# Patient Record
Sex: MALE | Race: OTHER | NOT HISPANIC OR LATINO | URBAN - METROPOLITAN AREA
[De-identification: names, ages, dates, MRNs, and addresses within clinical notes are randomized per-mention and may not be internally consistent; named-entity substitution may affect disease eponyms.]

---

## 2017-05-12 ENCOUNTER — INPATIENT (INPATIENT)
Facility: HOSPITAL | Age: 32
LOS: 5 days | Discharge: HOME | End: 2017-05-18
Attending: INTERNAL MEDICINE | Admitting: INTERNAL MEDICINE
Payer: MEDICAID

## 2017-05-12 VITALS
DIASTOLIC BLOOD PRESSURE: 89 MMHG | HEART RATE: 110 BPM | OXYGEN SATURATION: 100 % | RESPIRATION RATE: 20 BRPM | SYSTOLIC BLOOD PRESSURE: 140 MMHG | TEMPERATURE: 98 F

## 2017-05-12 DIAGNOSIS — K92.2 GASTROINTESTINAL HEMORRHAGE, UNSPECIFIED: ICD-10-CM

## 2017-05-12 LAB
ALBUMIN SERPL ELPH-MCNC: 4.3 G/DL — SIGNIFICANT CHANGE UP (ref 3.3–5)
ALP SERPL-CCNC: 106 U/L — SIGNIFICANT CHANGE UP (ref 40–120)
ALT FLD-CCNC: 59 U/L — HIGH (ref 4–41)
AMPHET UR-MCNC: NEGATIVE — SIGNIFICANT CHANGE UP
ANISOCYTOSIS BLD QL: SIGNIFICANT CHANGE UP
APAP SERPL-MCNC: < 15 UG/ML — LOW (ref 15–25)
APPEARANCE UR: SIGNIFICANT CHANGE UP
AST SERPL-CCNC: 89 U/L — HIGH (ref 4–40)
BACTERIA # UR AUTO: SIGNIFICANT CHANGE UP
BARBITURATES MEASUREMENT: NEGATIVE — SIGNIFICANT CHANGE UP
BARBITURATES UR SCN-MCNC: NEGATIVE — SIGNIFICANT CHANGE UP
BASE EXCESS BLDV CALC-SCNC: -2 MMOL/L — SIGNIFICANT CHANGE UP
BASOPHILS # BLD AUTO: 0.09 K/UL — SIGNIFICANT CHANGE UP (ref 0–0.2)
BASOPHILS NFR BLD AUTO: 1.4 % — SIGNIFICANT CHANGE UP (ref 0–2)
BASOPHILS NFR SPEC: 1.7 % — SIGNIFICANT CHANGE UP (ref 0–2)
BENZODIAZ SERPL-MCNC: NEGATIVE — SIGNIFICANT CHANGE UP
BENZODIAZ UR-MCNC: NEGATIVE — SIGNIFICANT CHANGE UP
BILIRUB SERPL-MCNC: 1.3 MG/DL — HIGH (ref 0.2–1.2)
BILIRUB UR-MCNC: NEGATIVE — SIGNIFICANT CHANGE UP
BLASTS # FLD: 0 % — SIGNIFICANT CHANGE UP (ref 0–0)
BLD GP AB SCN SERPL QL: NEGATIVE — SIGNIFICANT CHANGE UP
BLOOD GAS VENOUS - CREATININE: 0.53 MG/DL — SIGNIFICANT CHANGE UP (ref 0.5–1.3)
BLOOD UR QL VISUAL: HIGH
BUN SERPL-MCNC: 10 MG/DL — SIGNIFICANT CHANGE UP (ref 7–23)
CALCIUM SERPL-MCNC: 8.6 MG/DL — SIGNIFICANT CHANGE UP (ref 8.4–10.5)
CANNABINOIDS UR-MCNC: NEGATIVE — SIGNIFICANT CHANGE UP
CHLORIDE BLDV-SCNC: 105 MMOL/L — SIGNIFICANT CHANGE UP (ref 96–108)
CHLORIDE SERPL-SCNC: 99 MMOL/L — SIGNIFICANT CHANGE UP (ref 98–107)
CO2 SERPL-SCNC: 19 MMOL/L — LOW (ref 22–31)
COCAINE METAB.OTHER UR-MCNC: NEGATIVE — SIGNIFICANT CHANGE UP
COLOR SPEC: YELLOW — SIGNIFICANT CHANGE UP
CREAT SERPL-MCNC: 0.56 MG/DL — SIGNIFICANT CHANGE UP (ref 0.5–1.3)
EOSINOPHIL # BLD AUTO: 0.2 K/UL — SIGNIFICANT CHANGE UP (ref 0–0.5)
EOSINOPHIL NFR BLD AUTO: 3.1 % — SIGNIFICANT CHANGE UP (ref 0–6)
EOSINOPHIL NFR FLD: 1.7 % — SIGNIFICANT CHANGE UP (ref 0–6)
ETHANOL BLD-MCNC: 65 MG/DL — HIGH
GAS PNL BLDV: 132 MMOL/L — LOW (ref 136–146)
GIANT PLATELETS BLD QL SMEAR: PRESENT — SIGNIFICANT CHANGE UP
GLUCOSE BLDV-MCNC: 101 — HIGH (ref 70–99)
GLUCOSE SERPL-MCNC: 100 MG/DL — HIGH (ref 70–99)
GLUCOSE UR-MCNC: NEGATIVE — SIGNIFICANT CHANGE UP
HCO3 BLDV-SCNC: 23 MMOL/L — SIGNIFICANT CHANGE UP (ref 20–27)
HCT VFR BLD CALC: 27.5 % — LOW (ref 39–50)
HCT VFR BLDV CALC: 21.6 % — LOW (ref 39–51)
HGB BLD-MCNC: 7.5 G/DL — LOW (ref 13–17)
HGB BLDV-MCNC: 6.9 G/DL — CRITICAL LOW (ref 13–17)
HYPOCHROMIA BLD QL: SIGNIFICANT CHANGE UP
IMM GRANULOCYTES NFR BLD AUTO: 0.5 % — SIGNIFICANT CHANGE UP (ref 0–1.5)
KETONES UR-MCNC: NEGATIVE — SIGNIFICANT CHANGE UP
LACTATE BLDV-MCNC: 1.2 MMOL/L — SIGNIFICANT CHANGE UP (ref 0.5–2)
LEUKOCYTE ESTERASE UR-ACNC: NEGATIVE — SIGNIFICANT CHANGE UP
LIDOCAIN IGE QN: 53.9 U/L — SIGNIFICANT CHANGE UP (ref 7–60)
LYMPHOCYTES # BLD AUTO: 1.22 K/UL — SIGNIFICANT CHANGE UP (ref 1–3.3)
LYMPHOCYTES # BLD AUTO: 18.8 % — SIGNIFICANT CHANGE UP (ref 13–44)
LYMPHOCYTES NFR SPEC AUTO: 20 % — SIGNIFICANT CHANGE UP (ref 13–44)
MCHC RBC-ENTMCNC: 18.4 PG — LOW (ref 27–34)
MCHC RBC-ENTMCNC: 27.3 % — LOW (ref 32–36)
MCV RBC AUTO: 67.6 FL — LOW (ref 80–100)
METAMYELOCYTES # FLD: 0 % — SIGNIFICANT CHANGE UP (ref 0–1)
METHADONE UR-MCNC: NEGATIVE — SIGNIFICANT CHANGE UP
MICROCYTES BLD QL: SIGNIFICANT CHANGE UP
MONOCYTES # BLD AUTO: 0.63 K/UL — SIGNIFICANT CHANGE UP (ref 0–0.9)
MONOCYTES NFR BLD AUTO: 9.7 % — SIGNIFICANT CHANGE UP (ref 2–14)
MONOCYTES NFR BLD: 6.1 % — SIGNIFICANT CHANGE UP (ref 2–9)
MYELOCYTES NFR BLD: 0 % — SIGNIFICANT CHANGE UP (ref 0–0)
NEUTROPHIL AB SER-ACNC: 70.4 % — SIGNIFICANT CHANGE UP (ref 43–77)
NEUTROPHILS # BLD AUTO: 4.32 K/UL — SIGNIFICANT CHANGE UP (ref 1.8–7.4)
NEUTROPHILS NFR BLD AUTO: 66.5 % — SIGNIFICANT CHANGE UP (ref 43–77)
NEUTS BAND # BLD: 0 % — SIGNIFICANT CHANGE UP (ref 0–6)
NITRITE UR-MCNC: NEGATIVE — SIGNIFICANT CHANGE UP
OPIATES UR-MCNC: NEGATIVE — SIGNIFICANT CHANGE UP
OTHER - HEMATOLOGY %: 0 — SIGNIFICANT CHANGE UP
OXYCODONE UR-MCNC: NEGATIVE — SIGNIFICANT CHANGE UP
PCO2 BLDV: 38 MMHG — LOW (ref 41–51)
PCP UR-MCNC: NEGATIVE — SIGNIFICANT CHANGE UP
PH BLDV: 7.39 PH — SIGNIFICANT CHANGE UP (ref 7.32–7.43)
PH UR: 6.5 — SIGNIFICANT CHANGE UP (ref 4.6–8)
PLATELET # BLD AUTO: 54 K/UL — LOW (ref 150–400)
PLATELET COUNT - ESTIMATE: SIGNIFICANT CHANGE UP
PMV BLD: SIGNIFICANT CHANGE UP FL (ref 7–13)
PO2 BLDV: 49 MMHG — HIGH (ref 35–40)
POLYCHROMASIA BLD QL SMEAR: SLIGHT — SIGNIFICANT CHANGE UP
POTASSIUM BLDV-SCNC: 3.2 MMOL/L — LOW (ref 3.4–4.5)
POTASSIUM SERPL-MCNC: 3.6 MMOL/L — SIGNIFICANT CHANGE UP (ref 3.5–5.3)
POTASSIUM SERPL-SCNC: 3.6 MMOL/L — SIGNIFICANT CHANGE UP (ref 3.5–5.3)
PROMYELOCYTES # FLD: 0 % — SIGNIFICANT CHANGE UP (ref 0–0)
PROT SERPL-MCNC: 8.4 G/DL — HIGH (ref 6–8.3)
PROT UR-MCNC: 30 — SIGNIFICANT CHANGE UP
RBC # BLD: 4.07 M/UL — LOW (ref 4.2–5.8)
RBC # FLD: 22.6 % — HIGH (ref 10.3–14.5)
RBC CASTS # UR COMP ASSIST: SIGNIFICANT CHANGE UP (ref 0–?)
RH IG SCN BLD-IMP: POSITIVE — SIGNIFICANT CHANGE UP
RH IG SCN BLD-IMP: POSITIVE — SIGNIFICANT CHANGE UP
SALICYLATES SERPL-MCNC: < 5 MG/DL — LOW (ref 15–30)
SAO2 % BLDV: 77.1 % — SIGNIFICANT CHANGE UP (ref 60–85)
SODIUM SERPL-SCNC: 136 MMOL/L — SIGNIFICANT CHANGE UP (ref 135–145)
SP GR SPEC: 1.01 — SIGNIFICANT CHANGE UP (ref 1–1.03)
TARGETS BLD QL SMEAR: SLIGHT — SIGNIFICANT CHANGE UP
UROBILINOGEN FLD QL: NORMAL E.U. — SIGNIFICANT CHANGE UP (ref 0.1–0.2)
VARIANT LYMPHS # BLD: 0 % — SIGNIFICANT CHANGE UP
WBC # BLD: 6.49 K/UL — SIGNIFICANT CHANGE UP (ref 3.8–10.5)
WBC # FLD AUTO: 6.49 K/UL — SIGNIFICANT CHANGE UP (ref 3.8–10.5)
WBC UR QL: SIGNIFICANT CHANGE UP (ref 0–?)

## 2017-05-12 PROCEDURE — 71010: CPT | Mod: 26

## 2017-05-12 PROCEDURE — 99291 CRITICAL CARE FIRST HOUR: CPT

## 2017-05-12 RX ORDER — OCTREOTIDE ACETATE 200 UG/ML
100 INJECTION, SOLUTION INTRAVENOUS; SUBCUTANEOUS ONCE
Qty: 0 | Refills: 0 | Status: COMPLETED | OUTPATIENT
Start: 2017-05-12 | End: 2017-05-12

## 2017-05-12 RX ORDER — PANTOPRAZOLE SODIUM 20 MG/1
8 TABLET, DELAYED RELEASE ORAL
Qty: 80 | Refills: 0 | Status: DISCONTINUED | OUTPATIENT
Start: 2017-05-12 | End: 2017-05-13

## 2017-05-12 RX ORDER — OCTREOTIDE ACETATE 200 UG/ML
25 INJECTION, SOLUTION INTRAVENOUS; SUBCUTANEOUS
Qty: 500 | Refills: 0 | Status: DISCONTINUED | OUTPATIENT
Start: 2017-05-12 | End: 2017-05-18

## 2017-05-12 RX ORDER — PANTOPRAZOLE SODIUM 20 MG/1
80 TABLET, DELAYED RELEASE ORAL ONCE
Qty: 0 | Refills: 0 | Status: COMPLETED | OUTPATIENT
Start: 2017-05-12 | End: 2017-05-12

## 2017-05-12 RX ORDER — METOCLOPRAMIDE HCL 10 MG
10 TABLET ORAL ONCE
Qty: 0 | Refills: 0 | Status: COMPLETED | OUTPATIENT
Start: 2017-05-12 | End: 2017-05-12

## 2017-05-12 RX ORDER — FAMOTIDINE 10 MG/ML
20 INJECTION INTRAVENOUS ONCE
Qty: 0 | Refills: 0 | Status: COMPLETED | OUTPATIENT
Start: 2017-05-12 | End: 2017-05-12

## 2017-05-12 RX ORDER — TRANEXAMIC ACID 100 MG/ML
1000 INJECTION, SOLUTION INTRAVENOUS ONCE
Qty: 0 | Refills: 0 | Status: DISCONTINUED | OUTPATIENT
Start: 2017-05-12 | End: 2017-05-13

## 2017-05-12 RX ORDER — SODIUM CHLORIDE 9 MG/ML
1000 INJECTION INTRAMUSCULAR; INTRAVENOUS; SUBCUTANEOUS ONCE
Qty: 0 | Refills: 0 | Status: COMPLETED | OUTPATIENT
Start: 2017-05-12 | End: 2017-05-12

## 2017-05-12 RX ADMIN — OCTREOTIDE ACETATE 100 MICROGRAM(S): 200 INJECTION, SOLUTION INTRAVENOUS; SUBCUTANEOUS at 23:08

## 2017-05-12 RX ADMIN — PANTOPRAZOLE SODIUM 80 MILLIGRAM(S): 20 TABLET, DELAYED RELEASE ORAL at 22:53

## 2017-05-12 RX ADMIN — SODIUM CHLORIDE 1000 MILLILITER(S): 9 INJECTION INTRAMUSCULAR; INTRAVENOUS; SUBCUTANEOUS at 23:01

## 2017-05-12 RX ADMIN — PANTOPRAZOLE SODIUM 10 MG/HR: 20 TABLET, DELAYED RELEASE ORAL at 22:53

## 2017-05-12 RX ADMIN — FAMOTIDINE 20 MILLIGRAM(S): 10 INJECTION INTRAVENOUS at 22:53

## 2017-05-12 RX ADMIN — OCTREOTIDE ACETATE 5 MICROGRAM(S)/HR: 200 INJECTION, SOLUTION INTRAVENOUS; SUBCUTANEOUS at 23:08

## 2017-05-12 RX ADMIN — Medication 10 MILLIGRAM(S): at 23:25

## 2017-05-12 NOTE — ED PROVIDER NOTE - PROGRESS NOTE DETAILS
JW Discussed with GI.  Pt continues to put out additional blood, 400cc.  Massive transfusion protocol initiated.  GI in route.  Pt maintaining AW plan to intubate in the ICU.

## 2017-05-12 NOTE — ED PROVIDER NOTE - OBJECTIVE STATEMENT
31 YOM PMH liver disease NOS, varices per PT with bleeding, p/w sudden onset hematemesis.  Pt was at home at rest suffered from massive hematemesis. Pt came to the ED vomited 400cc in the ED.  Pt unable to provide additional history.

## 2017-05-12 NOTE — ED PROVIDER NOTE - ATTENDING CONTRIBUTION TO CARE
Seen and examined, alert, mod distress, anicteric, MM dry, crusted blood at lips, clear nares, clear lungs, heart reg, no murmur, abd soft, sl. distended, mild epigastric tenderness, no ashly/guarding, no CVAT, no edema, NT calves. Pt. with hx EtOH and states mult. episodes of hematemesis. +melena yest.

## 2017-05-12 NOTE — ED PROVIDER NOTE - CRITICAL CARE PROVIDED
interpretation of diagnostic studies/consultation with other physicians/additional history taking/direct patient care (not related to procedure)/documentation

## 2017-05-12 NOTE — ED ADULT TRIAGE NOTE - CHIEF COMPLAINT QUOTE
vomiting blood    pt admits to drinking several times a week.... drank today after work, ...vomited bright red blood... has dried blood on face abd shirt. sclera icteric

## 2017-05-12 NOTE — ED ADULT NURSE NOTE - OBJECTIVE STATEMENT
Meg RN: Patient received to trauma B. Aox4 and ambulatory. C/o bright red vomit x 7 times after drinking 4 beers after work. Also states episode this AM before work. States he worked very hard yesterday and also drank beers yesterday. Patient states he thinks he has liver issues but not sure what it's called. Patients vitals are stable denies CP, SOB. Patient breathing even and nonlabored. States mild dizziness, and mild epigastric pain. HR = 97 NSR on cardiac monitor. IV 16g inserted in left and right ac, labs drawn and sent. Report given to primary RN Jackelin.

## 2017-05-12 NOTE — ED PROVIDER NOTE - MEDICAL DECISION MAKING DETAILS
32 yo with 400cc BRB hematemesis in triage, hx "liver dz" and daily EtOH use, c/o weakness and abd pain, VSS, resus with octreotide, T+C 2U, likely variceal bleeding.

## 2017-05-13 DIAGNOSIS — F10.10 ALCOHOL ABUSE, UNCOMPLICATED: ICD-10-CM

## 2017-05-13 DIAGNOSIS — Z29.9 ENCOUNTER FOR PROPHYLACTIC MEASURES, UNSPECIFIED: ICD-10-CM

## 2017-05-13 DIAGNOSIS — I85.01 ESOPHAGEAL VARICES WITH BLEEDING: Chronic | ICD-10-CM

## 2017-05-13 DIAGNOSIS — D62 ACUTE POSTHEMORRHAGIC ANEMIA: ICD-10-CM

## 2017-05-13 DIAGNOSIS — K70.9 ALCOHOLIC LIVER DISEASE, UNSPECIFIED: ICD-10-CM

## 2017-05-13 LAB
ALBUMIN SERPL ELPH-MCNC: 3.8 G/DL — SIGNIFICANT CHANGE UP (ref 3.3–5)
ALP SERPL-CCNC: 89 U/L — SIGNIFICANT CHANGE UP (ref 40–120)
ALT FLD-CCNC: 48 U/L — HIGH (ref 4–41)
APTT BLD: 32.1 SEC — SIGNIFICANT CHANGE UP (ref 27.5–37.4)
AST SERPL-CCNC: 67 U/L — HIGH (ref 4–40)
BASE EXCESS BLDA CALC-SCNC: -3.9 MMOL/L — SIGNIFICANT CHANGE UP
BASOPHILS # BLD AUTO: 0.02 K/UL — SIGNIFICANT CHANGE UP (ref 0–0.2)
BASOPHILS # BLD AUTO: 0.03 K/UL — SIGNIFICANT CHANGE UP (ref 0–0.2)
BASOPHILS NFR BLD AUTO: 0.3 % — SIGNIFICANT CHANGE UP (ref 0–2)
BASOPHILS NFR BLD AUTO: 0.7 % — SIGNIFICANT CHANGE UP (ref 0–2)
BILIRUB SERPL-MCNC: 1.9 MG/DL — HIGH (ref 0.2–1.2)
BUN SERPL-MCNC: 15 MG/DL — SIGNIFICANT CHANGE UP (ref 7–23)
CALCIUM SERPL-MCNC: 7.8 MG/DL — LOW (ref 8.4–10.5)
CHLORIDE BLDA-SCNC: 106 MMOL/L — SIGNIFICANT CHANGE UP (ref 96–108)
CHLORIDE SERPL-SCNC: 100 MMOL/L — SIGNIFICANT CHANGE UP (ref 98–107)
CO2 SERPL-SCNC: 21 MMOL/L — LOW (ref 22–31)
CREAT BLDA-MCNC: 0.5 MG/DL — SIGNIFICANT CHANGE UP (ref 0.5–1.3)
CREAT SERPL-MCNC: 0.51 MG/DL — SIGNIFICANT CHANGE UP (ref 0.5–1.3)
EOSINOPHIL # BLD AUTO: 0.1 K/UL — SIGNIFICANT CHANGE UP (ref 0–0.5)
EOSINOPHIL # BLD AUTO: 0.11 K/UL — SIGNIFICANT CHANGE UP (ref 0–0.5)
EOSINOPHIL NFR BLD AUTO: 1.7 % — SIGNIFICANT CHANGE UP (ref 0–6)
EOSINOPHIL NFR BLD AUTO: 2.3 % — SIGNIFICANT CHANGE UP (ref 0–6)
GLUCOSE BLDA-MCNC: 142 MG/DL — HIGH (ref 70–99)
GLUCOSE SERPL-MCNC: 132 MG/DL — HIGH (ref 70–99)
HCO3 BLDA-SCNC: 21 MMOL/L — LOW (ref 22–26)
HCT VFR BLD CALC: 25.7 % — LOW (ref 39–50)
HCT VFR BLD CALC: 26.7 % — LOW (ref 39–50)
HCT VFR BLDA CALC: 23.1 % — LOW (ref 39–51)
HGB BLD-MCNC: 7.3 G/DL — LOW (ref 13–17)
HGB BLD-MCNC: 7.5 G/DL — LOW (ref 13–17)
HGB BLDA-MCNC: 7.4 G/DL — LOW (ref 13–17)
IMM GRANULOCYTES NFR BLD AUTO: 0.3 % — SIGNIFICANT CHANGE UP (ref 0–1.5)
IMM GRANULOCYTES NFR BLD AUTO: 0.7 % — SIGNIFICANT CHANGE UP (ref 0–1.5)
INR BLD: 1.37 — HIGH (ref 0.88–1.17)
LACTATE BLDA-SCNC: 1.6 MMOL/L — SIGNIFICANT CHANGE UP (ref 0.5–2)
LYMPHOCYTES # BLD AUTO: 0.5 K/UL — LOW (ref 1–3.3)
LYMPHOCYTES # BLD AUTO: 0.5 K/UL — LOW (ref 1–3.3)
LYMPHOCYTES # BLD AUTO: 11.5 % — LOW (ref 13–44)
LYMPHOCYTES # BLD AUTO: 7.9 % — LOW (ref 13–44)
MAGNESIUM SERPL-MCNC: 1.8 MG/DL — SIGNIFICANT CHANGE UP (ref 1.6–2.6)
MANUAL SMEAR VERIFICATION: SIGNIFICANT CHANGE UP
MCHC RBC-ENTMCNC: 20.2 PG — LOW (ref 27–34)
MCHC RBC-ENTMCNC: 20.2 PG — LOW (ref 27–34)
MCHC RBC-ENTMCNC: 28.1 % — LOW (ref 32–36)
MCHC RBC-ENTMCNC: 28.4 % — LOW (ref 32–36)
MCV RBC AUTO: 71 FL — LOW (ref 80–100)
MCV RBC AUTO: 71.8 FL — LOW (ref 80–100)
MONOCYTES # BLD AUTO: 0.47 K/UL — SIGNIFICANT CHANGE UP (ref 0–0.9)
MONOCYTES # BLD AUTO: 0.61 K/UL — SIGNIFICANT CHANGE UP (ref 0–0.9)
MONOCYTES NFR BLD AUTO: 10.8 % — SIGNIFICANT CHANGE UP (ref 2–14)
MONOCYTES NFR BLD AUTO: 9.7 % — SIGNIFICANT CHANGE UP (ref 2–14)
NEUTROPHILS # BLD AUTO: 3.23 K/UL — SIGNIFICANT CHANGE UP (ref 1.8–7.4)
NEUTROPHILS # BLD AUTO: 5.03 K/UL — SIGNIFICANT CHANGE UP (ref 1.8–7.4)
NEUTROPHILS NFR BLD AUTO: 74 % — SIGNIFICANT CHANGE UP (ref 43–77)
NEUTROPHILS NFR BLD AUTO: 80.1 % — HIGH (ref 43–77)
PCO2 BLDA: 44 MMHG — SIGNIFICANT CHANGE UP (ref 35–48)
PH BLDA: 7.31 PH — LOW (ref 7.35–7.45)
PHOSPHATE SERPL-MCNC: 4.6 MG/DL — HIGH (ref 2.5–4.5)
PLATELET # BLD AUTO: 54 K/UL — LOW (ref 150–400)
PLATELET # BLD AUTO: 57 K/UL — LOW (ref 150–400)
PMV BLD: SIGNIFICANT CHANGE UP FL (ref 7–13)
PMV BLD: SIGNIFICANT CHANGE UP FL (ref 7–13)
PO2 BLDA: 118 MMHG — HIGH (ref 83–108)
POTASSIUM BLDA-SCNC: 3.8 MMOL/L — SIGNIFICANT CHANGE UP (ref 3.4–4.5)
POTASSIUM SERPL-MCNC: 3.8 MMOL/L — SIGNIFICANT CHANGE UP (ref 3.5–5.3)
POTASSIUM SERPL-SCNC: 3.8 MMOL/L — SIGNIFICANT CHANGE UP (ref 3.5–5.3)
PROT SERPL-MCNC: 7.3 G/DL — SIGNIFICANT CHANGE UP (ref 6–8.3)
PROTHROM AB SERPL-ACNC: 15.5 SEC — HIGH (ref 9.8–13.1)
RBC # BLD: 3.62 M/UL — LOW (ref 4.2–5.8)
RBC # BLD: 3.72 M/UL — LOW (ref 4.2–5.8)
RBC # FLD: 22.8 % — HIGH (ref 10.3–14.5)
RBC # FLD: 23.3 % — HIGH (ref 10.3–14.5)
SAO2 % BLDA: 98.5 % — SIGNIFICANT CHANGE UP (ref 95–99)
SODIUM BLDA-SCNC: 132 MMOL/L — LOW (ref 136–146)
SODIUM SERPL-SCNC: 137 MMOL/L — SIGNIFICANT CHANGE UP (ref 135–145)
WBC # BLD: 4.36 K/UL — SIGNIFICANT CHANGE UP (ref 3.8–10.5)
WBC # BLD: 6.29 K/UL — SIGNIFICANT CHANGE UP (ref 3.8–10.5)
WBC # FLD AUTO: 4.36 K/UL — SIGNIFICANT CHANGE UP (ref 3.8–10.5)
WBC # FLD AUTO: 6.29 K/UL — SIGNIFICANT CHANGE UP (ref 3.8–10.5)

## 2017-05-13 PROCEDURE — 99291 CRITICAL CARE FIRST HOUR: CPT

## 2017-05-13 PROCEDURE — 36600 WITHDRAWAL OF ARTERIAL BLOOD: CPT | Mod: 59

## 2017-05-13 PROCEDURE — 76937 US GUIDE VASCULAR ACCESS: CPT | Mod: 26

## 2017-05-13 PROCEDURE — 31500 INSERT EMERGENCY AIRWAY: CPT

## 2017-05-13 PROCEDURE — 71010: CPT | Mod: 26

## 2017-05-13 RX ORDER — PANTOPRAZOLE SODIUM 20 MG/1
40 TABLET, DELAYED RELEASE ORAL
Qty: 0 | Refills: 0 | Status: DISCONTINUED | OUTPATIENT
Start: 2017-05-13 | End: 2017-05-15

## 2017-05-13 RX ORDER — PROPOFOL 10 MG/ML
50 INJECTION, EMULSION INTRAVENOUS ONCE
Qty: 0 | Refills: 0 | Status: COMPLETED | OUTPATIENT
Start: 2017-05-13 | End: 2017-05-13

## 2017-05-13 RX ORDER — PROPOFOL 10 MG/ML
5 INJECTION, EMULSION INTRAVENOUS ONCE
Qty: 0 | Refills: 0 | Status: DISCONTINUED | OUTPATIENT
Start: 2017-05-13 | End: 2017-05-13

## 2017-05-13 RX ORDER — MIDAZOLAM HYDROCHLORIDE 1 MG/ML
4 INJECTION, SOLUTION INTRAMUSCULAR; INTRAVENOUS ONCE
Qty: 0 | Refills: 0 | Status: DISCONTINUED | OUTPATIENT
Start: 2017-05-13 | End: 2017-05-13

## 2017-05-13 RX ORDER — NOREPINEPHRINE BITARTRATE/D5W 8 MG/250ML
0.03 PLASTIC BAG, INJECTION (ML) INTRAVENOUS
Qty: 8 | Refills: 0 | Status: DISCONTINUED | OUTPATIENT
Start: 2017-05-13 | End: 2017-05-13

## 2017-05-13 RX ORDER — CHLORHEXIDINE GLUCONATE 213 G/1000ML
1 SOLUTION TOPICAL DAILY
Qty: 0 | Refills: 0 | Status: DISCONTINUED | OUTPATIENT
Start: 2017-05-13 | End: 2017-05-16

## 2017-05-13 RX ORDER — PROPOFOL 10 MG/ML
50 INJECTION, EMULSION INTRAVENOUS
Qty: 1000 | Refills: 0 | Status: DISCONTINUED | OUTPATIENT
Start: 2017-05-13 | End: 2017-05-14

## 2017-05-13 RX ORDER — SODIUM CHLORIDE 9 MG/ML
1000 INJECTION, SOLUTION INTRAVENOUS
Qty: 0 | Refills: 0 | Status: COMPLETED | OUTPATIENT
Start: 2017-05-13 | End: 2017-05-13

## 2017-05-13 RX ORDER — CEFTRIAXONE 500 MG/1
1 INJECTION, POWDER, FOR SOLUTION INTRAMUSCULAR; INTRAVENOUS ONCE
Qty: 0 | Refills: 0 | Status: COMPLETED | OUTPATIENT
Start: 2017-05-13 | End: 2017-05-13

## 2017-05-13 RX ORDER — METOCLOPRAMIDE HCL 10 MG
10 TABLET ORAL ONCE
Qty: 0 | Refills: 0 | Status: COMPLETED | OUTPATIENT
Start: 2017-05-13 | End: 2017-05-13

## 2017-05-13 RX ORDER — CHLORHEXIDINE GLUCONATE 213 G/1000ML
1 SOLUTION TOPICAL DAILY
Qty: 0 | Refills: 0 | Status: DISCONTINUED | OUTPATIENT
Start: 2017-05-13 | End: 2017-05-13

## 2017-05-13 RX ORDER — MIDAZOLAM HYDROCHLORIDE 1 MG/ML
7 INJECTION, SOLUTION INTRAMUSCULAR; INTRAVENOUS
Qty: 100 | Refills: 0 | Status: DISCONTINUED | OUTPATIENT
Start: 2017-05-13 | End: 2017-05-14

## 2017-05-13 RX ORDER — PROPOFOL 10 MG/ML
50 INJECTION, EMULSION INTRAVENOUS ONCE
Qty: 0 | Refills: 0 | Status: DISCONTINUED | OUTPATIENT
Start: 2017-05-13 | End: 2017-05-14

## 2017-05-13 RX ORDER — CISATRACURIUM BESYLATE 2 MG/ML
3 INJECTION INTRAVENOUS
Qty: 200 | Refills: 0 | Status: DISCONTINUED | OUTPATIENT
Start: 2017-05-13 | End: 2017-05-13

## 2017-05-13 RX ORDER — CISATRACURIUM BESYLATE 2 MG/ML
16 INJECTION INTRAVENOUS ONCE
Qty: 0 | Refills: 0 | Status: COMPLETED | OUTPATIENT
Start: 2017-05-13 | End: 2017-05-13

## 2017-05-13 RX ORDER — PROPRANOLOL HCL 160 MG
5 CAPSULE, EXTENDED RELEASE 24HR ORAL ONCE
Qty: 0 | Refills: 0 | Status: DISCONTINUED | OUTPATIENT
Start: 2017-05-13 | End: 2017-05-13

## 2017-05-13 RX ORDER — CEFTRIAXONE 500 MG/1
1 INJECTION, POWDER, FOR SOLUTION INTRAMUSCULAR; INTRAVENOUS EVERY 24 HOURS
Qty: 0 | Refills: 0 | Status: DISCONTINUED | OUTPATIENT
Start: 2017-05-14 | End: 2017-05-18

## 2017-05-13 RX ORDER — CEFTRIAXONE 500 MG/1
INJECTION, POWDER, FOR SOLUTION INTRAMUSCULAR; INTRAVENOUS
Qty: 0 | Refills: 0 | Status: DISCONTINUED | OUTPATIENT
Start: 2017-05-13 | End: 2017-05-18

## 2017-05-13 RX ADMIN — PROPOFOL 25.38 MICROGRAM(S)/KG/MIN: 10 INJECTION, EMULSION INTRAVENOUS at 20:32

## 2017-05-13 RX ADMIN — PROPOFOL 50 MILLIGRAM(S): 10 INJECTION, EMULSION INTRAVENOUS at 00:00

## 2017-05-13 RX ADMIN — PROPOFOL 50 MILLIGRAM(S): 10 INJECTION, EMULSION INTRAVENOUS at 00:20

## 2017-05-13 RX ADMIN — PROPOFOL 50 MILLIGRAM(S): 10 INJECTION, EMULSION INTRAVENOUS at 01:00

## 2017-05-13 RX ADMIN — PROPOFOL 50 MILLIGRAM(S): 10 INJECTION, EMULSION INTRAVENOUS at 00:30

## 2017-05-13 RX ADMIN — PROPOFOL 25.38 MICROGRAM(S)/KG/MIN: 10 INJECTION, EMULSION INTRAVENOUS at 17:40

## 2017-05-13 RX ADMIN — PANTOPRAZOLE SODIUM 40 MILLIGRAM(S): 20 TABLET, DELAYED RELEASE ORAL at 17:34

## 2017-05-13 RX ADMIN — CISATRACURIUM BESYLATE 16 MILLIGRAM(S): 2 INJECTION INTRAVENOUS at 00:00

## 2017-05-13 RX ADMIN — SODIUM CHLORIDE 100 MILLILITER(S): 9 INJECTION, SOLUTION INTRAVENOUS at 05:21

## 2017-05-13 RX ADMIN — Medication 1 APPLICATION(S): at 17:35

## 2017-05-13 RX ADMIN — PROPOFOL 50 MILLIGRAM(S): 10 INJECTION, EMULSION INTRAVENOUS at 00:10

## 2017-05-13 RX ADMIN — PROPOFOL 25.38 MICROGRAM(S)/KG/MIN: 10 INJECTION, EMULSION INTRAVENOUS at 04:06

## 2017-05-13 RX ADMIN — MIDAZOLAM HYDROCHLORIDE 7 MG/HR: 1 INJECTION, SOLUTION INTRAMUSCULAR; INTRAVENOUS at 04:06

## 2017-05-13 RX ADMIN — MIDAZOLAM HYDROCHLORIDE 4 MG/HR: 1 INJECTION, SOLUTION INTRAMUSCULAR; INTRAVENOUS at 15:32

## 2017-05-13 RX ADMIN — PANTOPRAZOLE SODIUM 40 MILLIGRAM(S): 20 TABLET, DELAYED RELEASE ORAL at 05:15

## 2017-05-13 RX ADMIN — Medication 4.76 MICROGRAM(S)/KG/MIN: at 04:06

## 2017-05-13 RX ADMIN — OCTREOTIDE ACETATE 5 MICROGRAM(S)/HR: 200 INJECTION, SOLUTION INTRAVENOUS; SUBCUTANEOUS at 04:06

## 2017-05-13 RX ADMIN — PROPOFOL 25.38 MICROGRAM(S)/KG/MIN: 10 INJECTION, EMULSION INTRAVENOUS at 08:32

## 2017-05-13 RX ADMIN — OCTREOTIDE ACETATE 5 MICROGRAM(S)/HR: 200 INJECTION, SOLUTION INTRAVENOUS; SUBCUTANEOUS at 20:33

## 2017-05-13 RX ADMIN — MIDAZOLAM HYDROCHLORIDE 4 MILLIGRAM(S): 1 INJECTION, SOLUTION INTRAMUSCULAR; INTRAVENOUS at 00:30

## 2017-05-13 RX ADMIN — Medication 1 APPLICATION(S): at 05:15

## 2017-05-13 RX ADMIN — Medication 10 MILLIGRAM(S): at 01:00

## 2017-05-13 RX ADMIN — MIDAZOLAM HYDROCHLORIDE 4 MILLIGRAM(S): 1 INJECTION, SOLUTION INTRAMUSCULAR; INTRAVENOUS at 00:40

## 2017-05-13 RX ADMIN — PROPOFOL 50 MILLIGRAM(S): 10 INJECTION, EMULSION INTRAVENOUS at 00:45

## 2017-05-13 RX ADMIN — CEFTRIAXONE 100 GRAM(S): 500 INJECTION, POWDER, FOR SOLUTION INTRAMUSCULAR; INTRAVENOUS at 04:04

## 2017-05-13 RX ADMIN — MIDAZOLAM HYDROCHLORIDE 4 MG/HR: 1 INJECTION, SOLUTION INTRAMUSCULAR; INTRAVENOUS at 08:32

## 2017-05-13 RX ADMIN — MIDAZOLAM HYDROCHLORIDE 4 MILLIGRAM(S): 1 INJECTION, SOLUTION INTRAMUSCULAR; INTRAVENOUS at 00:00

## 2017-05-13 RX ADMIN — PROPOFOL 50 MILLIGRAM(S): 10 INJECTION, EMULSION INTRAVENOUS at 01:10

## 2017-05-13 RX ADMIN — MIDAZOLAM HYDROCHLORIDE 7 MG/HR: 1 INJECTION, SOLUTION INTRAMUSCULAR; INTRAVENOUS at 19:45

## 2017-05-13 RX ADMIN — CISATRACURIUM BESYLATE 15.23 MICROGRAM(S)/KG/MIN: 2 INJECTION INTRAVENOUS at 04:06

## 2017-05-13 RX ADMIN — CHLORHEXIDINE GLUCONATE 1 APPLICATION(S): 213 SOLUTION TOPICAL at 11:33

## 2017-05-13 RX ADMIN — PROPOFOL 25.38 MICROGRAM(S)/KG/MIN: 10 INJECTION, EMULSION INTRAVENOUS at 15:32

## 2017-05-13 RX ADMIN — CISATRACURIUM BESYLATE 16 MILLIGRAM(S): 2 INJECTION INTRAVENOUS at 02:13

## 2017-05-13 RX ADMIN — OCTREOTIDE ACETATE 5 MICROGRAM(S)/HR: 200 INJECTION, SOLUTION INTRAVENOUS; SUBCUTANEOUS at 08:33

## 2017-05-13 RX ADMIN — MIDAZOLAM HYDROCHLORIDE 4 MILLIGRAM(S): 1 INJECTION, SOLUTION INTRAMUSCULAR; INTRAVENOUS at 00:15

## 2017-05-13 RX ADMIN — OCTREOTIDE ACETATE 5 MICROGRAM(S)/HR: 200 INJECTION, SOLUTION INTRAVENOUS; SUBCUTANEOUS at 15:32

## 2017-05-13 NOTE — H&P ADULT. - PROBLEM SELECTOR PLAN 2
Patient with hx of alcohol abuse  -patient currently sedated, will need to monitor with CIWA protocol when patient is awake  as patient is high risk for withdrawal

## 2017-05-13 NOTE — H&P ADULT. - HISTORY OF PRESENT ILLNESS
30 yo with advanced liver disease with hx of cirrhosis s/p intervention (unclear exact intervention done at OSH), alcohol abuse (drinking for 17 years), presents for vomiting up blood x 1 day. Patient reports he had a similar episode about five months ago at OSH (not sure which one) and had an intervention at that time. Patient was told it was 2/2 alcohol use and was told to stop drinking, but did not. Patient reports he has been drinking 3-7 beers a day at this time. On the day of admission, patient had an episode of bloody vomiting and then it resolved, so he went to work. Patient reports later in the evening, he started having many episodes of bloody vomiting and that is why he came to the hospital. He also reports dark stool day before admission. Patient reports nausea and shaking in the ED. Patient reports no other fevers, chills, abdominal pain, no easy bruising otherwise.    Patient reports when he does not drink alcohol he had only had trouble sleeping. He reports no visual hallucination or auditory hallucinations, seizures, or intubation history. No hospitalization for alcohol withdrawal. 32 yo with advanced liver disease with hx of cirrhosis s/p intervention (unclear exact intervention done at OSH), alcohol abuse (drinking for 17 years), presents for vomiting up blood x 1 day. Patient reports he had a similar episode about five months ago at OSH (not sure which one) and had an intervention at that time. Patient was told it was 2/2 alcohol use and was told to stop drinking, but did not. Patient reports he has been drinking 3-7 beers a day at this time. On the day of admission, patient had an episode of bloody vomiting and then it resolved, so he went to work. Patient reports later in the evening, he started having many episodes of bloody vomiting and that is why he came to the hospital. He also reports dark stool day before admission. Patient reports nausea and shaking in the ED. Patient reports no other fevers, chills, abdominal pain, no easy bruising otherwise.    Patient reports when he does not drink alcohol he had only had trouble sleeping. He reports no visual hallucination or auditory hallucinations, seizures, or intubation history. No hospitalization for alcohol withdrawal.     In the ED: patient's BP: 140/89, HR of 110, T of 98.2F, RR of 20 with O2 saturation of 100%. Patient s/p octreotide 100mcg x 1, protonix 80mg x 1, reglan 10mg IVP x1, pepcid x1, NaCl 1L bolus, and pRBCs x 1.

## 2017-05-13 NOTE — H&P ADULT. - LAB RESULTS AND INTERPRETATION
Personally reviewed and interpreted:   CBC: Hemoglobin of 7.5, with thrombocytopenia, no leukocytosis noted  CMP: Patient with elevated AST and ALT, with T bilirubin of 1.3. Alcohol level of 65

## 2017-05-13 NOTE — H&P ADULT. - ASSESSMENT
30 yo with advanced liver disease with hx of cirrhosis s/p intervention (unclear exact intervention done at OSH), alcohol abuse (drinking for 17 years), presents for hematemesis x 1 day, with concern for esophageal varices.

## 2017-05-13 NOTE — H&P ADULT. - PROBLEM SELECTOR PLAN 1
Patient with hematemesis in setting of alcohol abuse, likely esophageal varices  -continue with pRBCs as needed, FFP, patient s/p pRBCs 2units x 1, f/u CBC closely q4h  -GI consulted: Plan for bedside endoscopy at this time   -octreotide, PPI gtt  -NPO at this time  -ceftriaxone for ppx Patient with hematemesis in setting of alcohol abuse, likely esophageal varices  -continue with pRBCs as needed, FFP, patient s/p pRBCs 2units x 1, f/u CBC closely q6h  -GI consulted: Plan for bedside endoscopy at this time   -octreotide, PPI gtt  -NPO at this time  -ceftriaxone for ppx

## 2017-05-13 NOTE — H&P ADULT. - PROBLEM SELECTOR PLAN 3
Patient reports history of advanced liver disease, no imaging on our system  -plan for US when patient stable  -continue with monitoring CMP

## 2017-05-14 LAB
ALBUMIN SERPL ELPH-MCNC: 3.5 G/DL — SIGNIFICANT CHANGE UP (ref 3.3–5)
ALP SERPL-CCNC: 83 U/L — SIGNIFICANT CHANGE UP (ref 40–120)
ALT FLD-CCNC: 53 U/L — HIGH (ref 4–41)
AST SERPL-CCNC: 87 U/L — HIGH (ref 4–40)
BASOPHILS # BLD AUTO: 0.06 K/UL — SIGNIFICANT CHANGE UP (ref 0–0.2)
BASOPHILS NFR BLD AUTO: 1.3 % — SIGNIFICANT CHANGE UP (ref 0–2)
BILIRUB SERPL-MCNC: 1.3 MG/DL — HIGH (ref 0.2–1.2)
BUN SERPL-MCNC: 12 MG/DL — SIGNIFICANT CHANGE UP (ref 7–23)
CALCIUM SERPL-MCNC: 8.4 MG/DL — SIGNIFICANT CHANGE UP (ref 8.4–10.5)
CHLORIDE SERPL-SCNC: 104 MMOL/L — SIGNIFICANT CHANGE UP (ref 98–107)
CO2 SERPL-SCNC: 25 MMOL/L — SIGNIFICANT CHANGE UP (ref 22–31)
CREAT SERPL-MCNC: 0.65 MG/DL — SIGNIFICANT CHANGE UP (ref 0.5–1.3)
EOSINOPHIL # BLD AUTO: 0.19 K/UL — SIGNIFICANT CHANGE UP (ref 0–0.5)
EOSINOPHIL NFR BLD AUTO: 4.3 % — SIGNIFICANT CHANGE UP (ref 0–6)
GLUCOSE SERPL-MCNC: 102 MG/DL — HIGH (ref 70–99)
HCT VFR BLD CALC: 26.6 % — LOW (ref 39–50)
HGB BLD-MCNC: 7.7 G/DL — LOW (ref 13–17)
IMM GRANULOCYTES NFR BLD AUTO: 0.2 % — SIGNIFICANT CHANGE UP (ref 0–1.5)
INR BLD: 1.24 — HIGH (ref 0.88–1.17)
LYMPHOCYTES # BLD AUTO: 0.74 K/UL — LOW (ref 1–3.3)
LYMPHOCYTES # BLD AUTO: 16.6 % — SIGNIFICANT CHANGE UP (ref 13–44)
MAGNESIUM SERPL-MCNC: 2 MG/DL — SIGNIFICANT CHANGE UP (ref 1.6–2.6)
MANUAL SMEAR VERIFICATION: SIGNIFICANT CHANGE UP
MCHC RBC-ENTMCNC: 20.9 PG — LOW (ref 27–34)
MCHC RBC-ENTMCNC: 28.9 % — LOW (ref 32–36)
MCV RBC AUTO: 72.3 FL — LOW (ref 80–100)
MONOCYTES # BLD AUTO: 0.41 K/UL — SIGNIFICANT CHANGE UP (ref 0–0.9)
MONOCYTES NFR BLD AUTO: 9.2 % — SIGNIFICANT CHANGE UP (ref 2–14)
NEUTROPHILS # BLD AUTO: 3.06 K/UL — SIGNIFICANT CHANGE UP (ref 1.8–7.4)
NEUTROPHILS NFR BLD AUTO: 68.4 % — SIGNIFICANT CHANGE UP (ref 43–77)
PHOSPHATE SERPL-MCNC: 3.7 MG/DL — SIGNIFICANT CHANGE UP (ref 2.5–4.5)
PLATELET # BLD AUTO: 54 K/UL — LOW (ref 150–400)
PMV BLD: SIGNIFICANT CHANGE UP FL (ref 7–13)
POTASSIUM SERPL-MCNC: 3.8 MMOL/L — SIGNIFICANT CHANGE UP (ref 3.5–5.3)
POTASSIUM SERPL-SCNC: 3.8 MMOL/L — SIGNIFICANT CHANGE UP (ref 3.5–5.3)
PROT SERPL-MCNC: 7.2 G/DL — SIGNIFICANT CHANGE UP (ref 6–8.3)
PROTHROM AB SERPL-ACNC: 13.9 SEC — HIGH (ref 9.8–13.1)
RBC # BLD: 3.68 M/UL — LOW (ref 4.2–5.8)
RBC # FLD: 23.7 % — HIGH (ref 10.3–14.5)
SODIUM SERPL-SCNC: 140 MMOL/L — SIGNIFICANT CHANGE UP (ref 135–145)
WBC # BLD: 4.47 K/UL — SIGNIFICANT CHANGE UP (ref 3.8–10.5)
WBC # FLD AUTO: 4.47 K/UL — SIGNIFICANT CHANGE UP (ref 3.8–10.5)

## 2017-05-14 PROCEDURE — 99291 CRITICAL CARE FIRST HOUR: CPT

## 2017-05-14 RX ORDER — THIAMINE MONONITRATE (VIT B1) 100 MG
100 TABLET ORAL DAILY
Qty: 0 | Refills: 0 | Status: DISCONTINUED | OUTPATIENT
Start: 2017-05-14 | End: 2017-05-15

## 2017-05-14 RX ORDER — PHENOBARBITAL 60 MG
65 TABLET ORAL ONCE
Qty: 0 | Refills: 0 | Status: DISCONTINUED | OUTPATIENT
Start: 2017-05-14 | End: 2017-05-14

## 2017-05-14 RX ORDER — PHENOBARBITAL 60 MG
130 TABLET ORAL
Qty: 0 | Refills: 0 | Status: DISCONTINUED | OUTPATIENT
Start: 2017-05-14 | End: 2017-05-15

## 2017-05-14 RX ORDER — SODIUM CHLORIDE 9 MG/ML
1000 INJECTION, SOLUTION INTRAVENOUS
Qty: 0 | Refills: 0 | Status: DISCONTINUED | OUTPATIENT
Start: 2017-05-14 | End: 2017-05-15

## 2017-05-14 RX ADMIN — OCTREOTIDE ACETATE 5 MICROGRAM(S)/HR: 200 INJECTION, SOLUTION INTRAVENOUS; SUBCUTANEOUS at 06:04

## 2017-05-14 RX ADMIN — SODIUM CHLORIDE 75 MILLILITER(S): 9 INJECTION, SOLUTION INTRAVENOUS at 12:04

## 2017-05-14 RX ADMIN — OCTREOTIDE ACETATE 5 MICROGRAM(S)/HR: 200 INJECTION, SOLUTION INTRAVENOUS; SUBCUTANEOUS at 07:15

## 2017-05-14 RX ADMIN — PANTOPRAZOLE SODIUM 40 MILLIGRAM(S): 20 TABLET, DELAYED RELEASE ORAL at 17:03

## 2017-05-14 RX ADMIN — CEFTRIAXONE 100 GRAM(S): 500 INJECTION, POWDER, FOR SOLUTION INTRAMUSCULAR; INTRAVENOUS at 00:09

## 2017-05-14 RX ADMIN — OCTREOTIDE ACETATE 5 MICROGRAM(S)/HR: 200 INJECTION, SOLUTION INTRAVENOUS; SUBCUTANEOUS at 19:10

## 2017-05-14 RX ADMIN — Medication 65 MILLIGRAM(S): at 08:24

## 2017-05-14 RX ADMIN — PANTOPRAZOLE SODIUM 40 MILLIGRAM(S): 20 TABLET, DELAYED RELEASE ORAL at 06:03

## 2017-05-14 RX ADMIN — Medication 1 APPLICATION(S): at 06:03

## 2017-05-14 RX ADMIN — PROPOFOL 25.38 MICROGRAM(S)/KG/MIN: 10 INJECTION, EMULSION INTRAVENOUS at 07:15

## 2017-05-14 RX ADMIN — PROPOFOL 25.38 MICROGRAM(S)/KG/MIN: 10 INJECTION, EMULSION INTRAVENOUS at 08:24

## 2017-05-14 RX ADMIN — MIDAZOLAM HYDROCHLORIDE 7 MG/HR: 1 INJECTION, SOLUTION INTRAMUSCULAR; INTRAVENOUS at 06:04

## 2017-05-14 RX ADMIN — Medication 100 MILLIGRAM(S): at 12:35

## 2017-05-14 RX ADMIN — PROPOFOL 25.38 MICROGRAM(S)/KG/MIN: 10 INJECTION, EMULSION INTRAVENOUS at 06:03

## 2017-05-14 RX ADMIN — MIDAZOLAM HYDROCHLORIDE 7 MG/HR: 1 INJECTION, SOLUTION INTRAMUSCULAR; INTRAVENOUS at 07:15

## 2017-05-14 RX ADMIN — CHLORHEXIDINE GLUCONATE 1 APPLICATION(S): 213 SOLUTION TOPICAL at 11:52

## 2017-05-14 RX ADMIN — SODIUM CHLORIDE 75 MILLILITER(S): 9 INJECTION, SOLUTION INTRAVENOUS at 19:10

## 2017-05-14 RX ADMIN — OCTREOTIDE ACETATE 5 MICROGRAM(S)/HR: 200 INJECTION, SOLUTION INTRAVENOUS; SUBCUTANEOUS at 10:20

## 2017-05-15 LAB
ALBUMIN SERPL ELPH-MCNC: 3.6 G/DL — SIGNIFICANT CHANGE UP (ref 3.3–5)
ALP SERPL-CCNC: 83 U/L — SIGNIFICANT CHANGE UP (ref 40–120)
ALT FLD-CCNC: 61 U/L — HIGH (ref 4–41)
APTT BLD: 29.3 SEC — SIGNIFICANT CHANGE UP (ref 27.5–37.4)
AST SERPL-CCNC: 93 U/L — HIGH (ref 4–40)
BASOPHILS # BLD AUTO: 0.05 K/UL — SIGNIFICANT CHANGE UP (ref 0–0.2)
BASOPHILS NFR BLD AUTO: 1.2 % — SIGNIFICANT CHANGE UP (ref 0–2)
BILIRUB SERPL-MCNC: 1.4 MG/DL — HIGH (ref 0.2–1.2)
BUN SERPL-MCNC: 9 MG/DL — SIGNIFICANT CHANGE UP (ref 7–23)
CALCIUM SERPL-MCNC: 8.3 MG/DL — LOW (ref 8.4–10.5)
CHLORIDE SERPL-SCNC: 102 MMOL/L — SIGNIFICANT CHANGE UP (ref 98–107)
CO2 SERPL-SCNC: 24 MMOL/L — SIGNIFICANT CHANGE UP (ref 22–31)
CREAT SERPL-MCNC: 0.6 MG/DL — SIGNIFICANT CHANGE UP (ref 0.5–1.3)
EOSINOPHIL # BLD AUTO: 0.27 K/UL — SIGNIFICANT CHANGE UP (ref 0–0.5)
EOSINOPHIL NFR BLD AUTO: 6.3 % — HIGH (ref 0–6)
GLUCOSE SERPL-MCNC: 89 MG/DL — SIGNIFICANT CHANGE UP (ref 70–99)
HCT VFR BLD CALC: 27.8 % — LOW (ref 39–50)
HGB BLD-MCNC: 8 G/DL — LOW (ref 13–17)
IMM GRANULOCYTES NFR BLD AUTO: 0.2 % — SIGNIFICANT CHANGE UP (ref 0–1.5)
INR BLD: 1.23 — HIGH (ref 0.88–1.17)
LYMPHOCYTES # BLD AUTO: 0.92 K/UL — LOW (ref 1–3.3)
LYMPHOCYTES # BLD AUTO: 21.5 % — SIGNIFICANT CHANGE UP (ref 13–44)
MAGNESIUM SERPL-MCNC: 1.8 MG/DL — SIGNIFICANT CHANGE UP (ref 1.6–2.6)
MANUAL SMEAR VERIFICATION: SIGNIFICANT CHANGE UP
MCHC RBC-ENTMCNC: 20.6 PG — LOW (ref 27–34)
MCHC RBC-ENTMCNC: 28.8 % — LOW (ref 32–36)
MCV RBC AUTO: 71.6 FL — LOW (ref 80–100)
MONOCYTES # BLD AUTO: 0.49 K/UL — SIGNIFICANT CHANGE UP (ref 0–0.9)
MONOCYTES NFR BLD AUTO: 11.4 % — SIGNIFICANT CHANGE UP (ref 2–14)
NEUTROPHILS # BLD AUTO: 2.54 K/UL — SIGNIFICANT CHANGE UP (ref 1.8–7.4)
NEUTROPHILS NFR BLD AUTO: 59.4 % — SIGNIFICANT CHANGE UP (ref 43–77)
PHOSPHATE SERPL-MCNC: 3.6 MG/DL — SIGNIFICANT CHANGE UP (ref 2.5–4.5)
PLATELET # BLD AUTO: 61 K/UL — LOW (ref 150–400)
PMV BLD: SIGNIFICANT CHANGE UP FL (ref 7–13)
POTASSIUM SERPL-MCNC: 3.7 MMOL/L — SIGNIFICANT CHANGE UP (ref 3.5–5.3)
POTASSIUM SERPL-SCNC: 3.7 MMOL/L — SIGNIFICANT CHANGE UP (ref 3.5–5.3)
PREALB SERPL-MCNC: 13 MG/DL — LOW (ref 20–40)
PROT SERPL-MCNC: 7.3 G/DL — SIGNIFICANT CHANGE UP (ref 6–8.3)
PROTHROM AB SERPL-ACNC: 13.8 SEC — HIGH (ref 9.8–13.1)
RBC # BLD: 3.88 M/UL — LOW (ref 4.2–5.8)
RBC # FLD: 24.2 % — HIGH (ref 10.3–14.5)
SODIUM SERPL-SCNC: 139 MMOL/L — SIGNIFICANT CHANGE UP (ref 135–145)
WBC # BLD: 4.28 K/UL — SIGNIFICANT CHANGE UP (ref 3.8–10.5)
WBC # FLD AUTO: 4.28 K/UL — SIGNIFICANT CHANGE UP (ref 3.8–10.5)

## 2017-05-15 PROCEDURE — 99291 CRITICAL CARE FIRST HOUR: CPT

## 2017-05-15 PROCEDURE — 99232 SBSQ HOSP IP/OBS MODERATE 35: CPT | Mod: GC

## 2017-05-15 PROCEDURE — 76705 ECHO EXAM OF ABDOMEN: CPT | Mod: 26

## 2017-05-15 RX ORDER — THIAMINE MONONITRATE (VIT B1) 100 MG
100 TABLET ORAL DAILY
Qty: 0 | Refills: 0 | Status: DISCONTINUED | OUTPATIENT
Start: 2017-05-15 | End: 2017-05-18

## 2017-05-15 RX ORDER — FOLIC ACID 0.8 MG
1 TABLET ORAL DAILY
Qty: 0 | Refills: 0 | Status: DISCONTINUED | OUTPATIENT
Start: 2017-05-15 | End: 2017-05-18

## 2017-05-15 RX ORDER — ONDANSETRON 8 MG/1
4 TABLET, FILM COATED ORAL ONCE
Qty: 0 | Refills: 0 | Status: COMPLETED | OUTPATIENT
Start: 2017-05-15 | End: 2017-05-15

## 2017-05-15 RX ORDER — PANTOPRAZOLE SODIUM 20 MG/1
40 TABLET, DELAYED RELEASE ORAL DAILY
Qty: 0 | Refills: 0 | Status: DISCONTINUED | OUTPATIENT
Start: 2017-05-15 | End: 2017-05-16

## 2017-05-15 RX ADMIN — Medication 1 TABLET(S): at 12:10

## 2017-05-15 RX ADMIN — CEFTRIAXONE 100 GRAM(S): 500 INJECTION, POWDER, FOR SOLUTION INTRAMUSCULAR; INTRAVENOUS at 00:21

## 2017-05-15 RX ADMIN — ONDANSETRON 4 MILLIGRAM(S): 8 TABLET, FILM COATED ORAL at 08:35

## 2017-05-15 RX ADMIN — Medication 1 MILLIGRAM(S): at 12:10

## 2017-05-15 RX ADMIN — PANTOPRAZOLE SODIUM 40 MILLIGRAM(S): 20 TABLET, DELAYED RELEASE ORAL at 06:31

## 2017-05-15 RX ADMIN — OCTREOTIDE ACETATE 5 MICROGRAM(S)/HR: 200 INJECTION, SOLUTION INTRAVENOUS; SUBCUTANEOUS at 19:15

## 2017-05-15 RX ADMIN — PANTOPRAZOLE SODIUM 40 MILLIGRAM(S): 20 TABLET, DELAYED RELEASE ORAL at 12:14

## 2017-05-15 RX ADMIN — OCTREOTIDE ACETATE 5 MICROGRAM(S)/HR: 200 INJECTION, SOLUTION INTRAVENOUS; SUBCUTANEOUS at 08:17

## 2017-05-15 RX ADMIN — Medication 100 MILLIGRAM(S): at 12:10

## 2017-05-16 LAB
ALBUMIN SERPL ELPH-MCNC: 3.9 G/DL — SIGNIFICANT CHANGE UP (ref 3.3–5)
ALP SERPL-CCNC: 94 U/L — SIGNIFICANT CHANGE UP (ref 40–120)
ALT FLD-CCNC: 76 U/L — HIGH (ref 4–41)
AST SERPL-CCNC: 106 U/L — HIGH (ref 4–40)
BILIRUB SERPL-MCNC: 1.4 MG/DL — HIGH (ref 0.2–1.2)
BUN SERPL-MCNC: 12 MG/DL — SIGNIFICANT CHANGE UP (ref 7–23)
CALCIUM SERPL-MCNC: 8.6 MG/DL — SIGNIFICANT CHANGE UP (ref 8.4–10.5)
CHLORIDE SERPL-SCNC: 102 MMOL/L — SIGNIFICANT CHANGE UP (ref 98–107)
CO2 SERPL-SCNC: 23 MMOL/L — SIGNIFICANT CHANGE UP (ref 22–31)
CREAT SERPL-MCNC: 0.67 MG/DL — SIGNIFICANT CHANGE UP (ref 0.5–1.3)
GLUCOSE SERPL-MCNC: 98 MG/DL — SIGNIFICANT CHANGE UP (ref 70–99)
HCT VFR BLD CALC: 28.7 % — LOW (ref 39–50)
HGB BLD-MCNC: 8.6 G/DL — LOW (ref 13–17)
MAGNESIUM SERPL-MCNC: 1.8 MG/DL — SIGNIFICANT CHANGE UP (ref 1.6–2.6)
MCHC RBC-ENTMCNC: 21.3 PG — LOW (ref 27–34)
MCHC RBC-ENTMCNC: 30 % — LOW (ref 32–36)
MCV RBC AUTO: 71.2 FL — LOW (ref 80–100)
PHOSPHATE SERPL-MCNC: 4.1 MG/DL — SIGNIFICANT CHANGE UP (ref 2.5–4.5)
PLATELET # BLD AUTO: 60 K/UL — LOW (ref 150–400)
PMV BLD: SIGNIFICANT CHANGE UP FL (ref 7–13)
POTASSIUM SERPL-MCNC: 3.9 MMOL/L — SIGNIFICANT CHANGE UP (ref 3.5–5.3)
POTASSIUM SERPL-SCNC: 3.9 MMOL/L — SIGNIFICANT CHANGE UP (ref 3.5–5.3)
PROT SERPL-MCNC: 7.8 G/DL — SIGNIFICANT CHANGE UP (ref 6–8.3)
RBC # BLD: 4.03 M/UL — LOW (ref 4.2–5.8)
RBC # FLD: 24.1 % — HIGH (ref 10.3–14.5)
SODIUM SERPL-SCNC: 141 MMOL/L — SIGNIFICANT CHANGE UP (ref 135–145)
WBC # BLD: 4.68 K/UL — SIGNIFICANT CHANGE UP (ref 3.8–10.5)
WBC # FLD AUTO: 4.68 K/UL — SIGNIFICANT CHANGE UP (ref 3.8–10.5)

## 2017-05-16 PROCEDURE — 99232 SBSQ HOSP IP/OBS MODERATE 35: CPT | Mod: GC

## 2017-05-16 PROCEDURE — 99233 SBSQ HOSP IP/OBS HIGH 50: CPT | Mod: GC

## 2017-05-16 RX ORDER — PROPRANOLOL HCL 160 MG
10 CAPSULE, EXTENDED RELEASE 24HR ORAL DAILY
Qty: 0 | Refills: 0 | Status: DISCONTINUED | OUTPATIENT
Start: 2017-05-16 | End: 2017-05-18

## 2017-05-16 RX ORDER — DIPHENHYDRAMINE HCL 50 MG
25 CAPSULE ORAL ONCE
Qty: 0 | Refills: 0 | Status: COMPLETED | OUTPATIENT
Start: 2017-05-16 | End: 2017-05-16

## 2017-05-16 RX ORDER — ZOLPIDEM TARTRATE 10 MG/1
5 TABLET ORAL ONCE
Qty: 0 | Refills: 0 | Status: DISCONTINUED | OUTPATIENT
Start: 2017-05-16 | End: 2017-05-16

## 2017-05-16 RX ORDER — PANTOPRAZOLE SODIUM 20 MG/1
40 TABLET, DELAYED RELEASE ORAL
Qty: 0 | Refills: 0 | Status: DISCONTINUED | OUTPATIENT
Start: 2017-05-16 | End: 2017-05-18

## 2017-05-16 RX ADMIN — ZOLPIDEM TARTRATE 5 MILLIGRAM(S): 10 TABLET ORAL at 00:42

## 2017-05-16 RX ADMIN — PANTOPRAZOLE SODIUM 40 MILLIGRAM(S): 20 TABLET, DELAYED RELEASE ORAL at 11:50

## 2017-05-16 RX ADMIN — OCTREOTIDE ACETATE 5 MICROGRAM(S)/HR: 200 INJECTION, SOLUTION INTRAVENOUS; SUBCUTANEOUS at 22:09

## 2017-05-16 RX ADMIN — Medication 100 MILLIGRAM(S): at 22:08

## 2017-05-16 RX ADMIN — Medication 1 TABLET(S): at 11:50

## 2017-05-16 RX ADMIN — OCTREOTIDE ACETATE 5 MICROGRAM(S)/HR: 200 INJECTION, SOLUTION INTRAVENOUS; SUBCUTANEOUS at 00:26

## 2017-05-16 RX ADMIN — Medication 2 MILLIGRAM(S): at 08:13

## 2017-05-16 RX ADMIN — Medication 100 MILLIGRAM(S): at 11:50

## 2017-05-16 RX ADMIN — CEFTRIAXONE 100 GRAM(S): 500 INJECTION, POWDER, FOR SOLUTION INTRAMUSCULAR; INTRAVENOUS at 00:14

## 2017-05-16 RX ADMIN — Medication 1 MILLIGRAM(S): at 11:50

## 2017-05-16 RX ADMIN — Medication 10 MILLIGRAM(S): at 11:50

## 2017-05-16 RX ADMIN — OCTREOTIDE ACETATE 5 MICROGRAM(S)/HR: 200 INJECTION, SOLUTION INTRAVENOUS; SUBCUTANEOUS at 19:49

## 2017-05-17 DIAGNOSIS — K92.0 HEMATEMESIS: ICD-10-CM

## 2017-05-17 LAB
A1AT SERPL-MCNC: 212 MG/DL — HIGH (ref 90–200)
ALBUMIN SERPL ELPH-MCNC: 4.1 G/DL — SIGNIFICANT CHANGE UP (ref 3.3–5)
ALP SERPL-CCNC: 109 U/L — SIGNIFICANT CHANGE UP (ref 40–120)
ALT FLD-CCNC: 90 U/L — HIGH (ref 4–41)
AST SERPL-CCNC: 108 U/L — HIGH (ref 4–40)
BILIRUB SERPL-MCNC: 1.5 MG/DL — HIGH (ref 0.2–1.2)
BUN SERPL-MCNC: 14 MG/DL — SIGNIFICANT CHANGE UP (ref 7–23)
CALCIUM SERPL-MCNC: 9.1 MG/DL — SIGNIFICANT CHANGE UP (ref 8.4–10.5)
CERULOPLASMIN SERPL-MCNC: 31 MG/DL — SIGNIFICANT CHANGE UP (ref 20–60)
CHLORIDE SERPL-SCNC: 98 MMOL/L — SIGNIFICANT CHANGE UP (ref 98–107)
CO2 SERPL-SCNC: 23 MMOL/L — SIGNIFICANT CHANGE UP (ref 22–31)
CREAT SERPL-MCNC: 0.72 MG/DL — SIGNIFICANT CHANGE UP (ref 0.5–1.3)
FERRITIN SERPL-MCNC: 18.86 NG/ML — LOW (ref 30–400)
GLUCOSE SERPL-MCNC: 83 MG/DL — SIGNIFICANT CHANGE UP (ref 70–99)
HBV CORE IGM SER-ACNC: NONREACTIVE — SIGNIFICANT CHANGE UP
HBV SURFACE AB SER-ACNC: NONREACTIVE — SIGNIFICANT CHANGE UP
HBV SURFACE AG SER-ACNC: NEGATIVE — SIGNIFICANT CHANGE UP
HCT VFR BLD CALC: 33.2 % — LOW (ref 39–50)
HGB BLD-MCNC: 9.5 G/DL — LOW (ref 13–17)
INR BLD: 1.3 — HIGH (ref 0.88–1.17)
IRON SATN MFR SERPL: 22 UG/DL — LOW (ref 45–165)
IRON SATN MFR SERPL: 379 UG/DL — SIGNIFICANT CHANGE UP (ref 155–535)
MAGNESIUM SERPL-MCNC: 1.9 MG/DL — SIGNIFICANT CHANGE UP (ref 1.6–2.6)
MCHC RBC-ENTMCNC: 20.3 PG — LOW (ref 27–34)
MCHC RBC-ENTMCNC: 28.6 % — LOW (ref 32–36)
MCV RBC AUTO: 70.9 FL — LOW (ref 80–100)
PHOSPHATE SERPL-MCNC: 3.8 MG/DL — SIGNIFICANT CHANGE UP (ref 2.5–4.5)
PLATELET # BLD AUTO: 77 K/UL — LOW (ref 150–400)
PMV BLD: SIGNIFICANT CHANGE UP FL (ref 7–13)
POTASSIUM SERPL-MCNC: 4.2 MMOL/L — SIGNIFICANT CHANGE UP (ref 3.5–5.3)
POTASSIUM SERPL-SCNC: 4.2 MMOL/L — SIGNIFICANT CHANGE UP (ref 3.5–5.3)
PROT SERPL-MCNC: 8.6 G/DL — HIGH (ref 6–8.3)
PROTHROM AB SERPL-ACNC: 14.6 SEC — HIGH (ref 9.8–13.1)
RBC # BLD: 4.68 M/UL — SIGNIFICANT CHANGE UP (ref 4.2–5.8)
RBC # FLD: 24 % — HIGH (ref 10.3–14.5)
SODIUM SERPL-SCNC: 136 MMOL/L — SIGNIFICANT CHANGE UP (ref 135–145)
UIBC SERPL-MCNC: 357 UG/DL — SIGNIFICANT CHANGE UP (ref 110–370)
WBC # BLD: 6.19 K/UL — SIGNIFICANT CHANGE UP (ref 3.8–10.5)
WBC # FLD AUTO: 6.19 K/UL — SIGNIFICANT CHANGE UP (ref 3.8–10.5)

## 2017-05-17 PROCEDURE — 99233 SBSQ HOSP IP/OBS HIGH 50: CPT | Mod: GC

## 2017-05-17 PROCEDURE — 99232 SBSQ HOSP IP/OBS MODERATE 35: CPT | Mod: GC

## 2017-05-17 RX ORDER — FERROUS SULFATE 325(65) MG
325 TABLET ORAL DAILY
Qty: 0 | Refills: 0 | Status: DISCONTINUED | OUTPATIENT
Start: 2017-05-17 | End: 2017-05-18

## 2017-05-17 RX ORDER — DOCUSATE SODIUM 100 MG
100 CAPSULE ORAL THREE TIMES A DAY
Qty: 0 | Refills: 0 | Status: DISCONTINUED | OUTPATIENT
Start: 2017-05-17 | End: 2017-05-18

## 2017-05-17 RX ORDER — LANOLIN ALCOHOL/MO/W.PET/CERES
3 CREAM (GRAM) TOPICAL AT BEDTIME
Qty: 0 | Refills: 0 | Status: DISCONTINUED | OUTPATIENT
Start: 2017-05-17 | End: 2017-05-18

## 2017-05-17 RX ADMIN — Medication 1 TABLET(S): at 11:30

## 2017-05-17 RX ADMIN — Medication 25 MILLIGRAM(S): at 00:12

## 2017-05-17 RX ADMIN — CEFTRIAXONE 100 GRAM(S): 500 INJECTION, POWDER, FOR SOLUTION INTRAMUSCULAR; INTRAVENOUS at 00:12

## 2017-05-17 RX ADMIN — Medication 100 MILLIGRAM(S): at 11:30

## 2017-05-17 RX ADMIN — Medication 1 MILLIGRAM(S): at 11:30

## 2017-05-17 RX ADMIN — PANTOPRAZOLE SODIUM 40 MILLIGRAM(S): 20 TABLET, DELAYED RELEASE ORAL at 05:59

## 2017-05-17 RX ADMIN — Medication 3 MILLIGRAM(S): at 23:00

## 2017-05-17 RX ADMIN — Medication 325 MILLIGRAM(S): at 11:30

## 2017-05-17 RX ADMIN — Medication 100 MILLIGRAM(S): at 22:50

## 2017-05-17 RX ADMIN — Medication 10 MILLIGRAM(S): at 05:50

## 2017-05-17 NOTE — PROGRESS NOTE ADULT - PROBLEM SELECTOR PLAN 1
S/P EGD with variceal band ligation.  Continue PPI daily.  Octreotide total 5 days.  Can stop ceftriaxone.    ETOH cessation.  No signs/symptoms of withdrawal.  Repeat endoscopy in 1 month for surveillance of eradication. S/P EGD with variceal band ligation.  Continue PPI daily.  Octreotide total 5 days.  Can stop ceftriaxone.  Followup labs ordered for alternative etiology of chronic liver disease  ETOH cessation.  No signs/symptoms of withdrawal.  Repeat endoscopy in 1 month for surveillance of eradication.

## 2017-05-17 NOTE — PROGRESS NOTE ADULT - ATTENDING COMMENTS
Patient without any s/s of alcohol w/d. Continued on octreotide gtt until tomorrow. Will be d/c'd tomorrow after octreotide gtt completed, Last dose of CTX tomorrow. Patient counselled on EtOH cessation, given resources by [facility].

## 2017-05-18 ENCOUNTER — TRANSCRIPTION ENCOUNTER (OUTPATIENT)
Age: 32
End: 2017-05-18

## 2017-05-18 VITALS
RESPIRATION RATE: 18 BRPM | DIASTOLIC BLOOD PRESSURE: 54 MMHG | OXYGEN SATURATION: 100 % | TEMPERATURE: 98 F | HEART RATE: 54 BPM | SYSTOLIC BLOOD PRESSURE: 95 MMHG

## 2017-05-18 PROBLEM — K70.9 ALCOHOLIC LIVER DISEASE, UNSPECIFIED: Chronic | Status: ACTIVE | Noted: 2017-05-12

## 2017-05-18 PROBLEM — Z00.00 ENCOUNTER FOR PREVENTIVE HEALTH EXAMINATION: Status: ACTIVE | Noted: 2017-05-18

## 2017-05-18 LAB
ANA PAT FLD IF-IMP: SIGNIFICANT CHANGE UP
ANA PATTERN 2: SIGNIFICANT CHANGE UP
ANA TITR SER: SIGNIFICANT CHANGE UP
ANTI NUCLEAR FACTOR TITER 2: SIGNIFICANT CHANGE UP
CMV IGG FLD QL: 3.8 U/ML — HIGH
CMV IGG SERPL-IMP: POSITIVE — SIGNIFICANT CHANGE UP
EBV EA AB TITR SER IF: POSITIVE — SIGNIFICANT CHANGE UP
EBV EA IGG SER-ACNC: NEGATIVE — SIGNIFICANT CHANGE UP
EBV PATRN SPEC IB-IMP: SIGNIFICANT CHANGE UP
EBV VCA IGG AVIDITY SER QL IA: POSITIVE — SIGNIFICANT CHANGE UP
EBV VCA IGM TITR FLD: NEGATIVE — SIGNIFICANT CHANGE UP
HAV IGG+IGM SER QL: REACTIVE — SIGNIFICANT CHANGE UP
HIV1 AG SER QL: SIGNIFICANT CHANGE UP
HIV1+2 AB SPEC QL: SIGNIFICANT CHANGE UP
IGA FLD-MCNC: 854 MG/DL — HIGH (ref 70–400)
IGG FLD-MCNC: 1589 MG/DL — SIGNIFICANT CHANGE UP (ref 700–1600)
IGM SERPL-MCNC: 163 MG/DL — SIGNIFICANT CHANGE UP (ref 40–230)
MITOCHONDRIA AB SER-ACNC: SIGNIFICANT CHANGE UP
SMOOTH MUSCLE AB SER-ACNC: SIGNIFICANT CHANGE UP

## 2017-05-18 PROCEDURE — 99239 HOSP IP/OBS DSCHRG MGMT >30: CPT

## 2017-05-18 PROCEDURE — 99232 SBSQ HOSP IP/OBS MODERATE 35: CPT | Mod: GC

## 2017-05-18 RX ORDER — FOLIC ACID 0.8 MG
1 TABLET ORAL
Qty: 30 | Refills: 0 | OUTPATIENT
Start: 2017-05-18 | End: 2017-06-17

## 2017-05-18 RX ORDER — PANTOPRAZOLE SODIUM 20 MG/1
1 TABLET, DELAYED RELEASE ORAL
Qty: 60 | Refills: 0 | OUTPATIENT
Start: 2017-05-18 | End: 2017-06-17

## 2017-05-18 RX ORDER — PROPRANOLOL HCL 160 MG
1 CAPSULE, EXTENDED RELEASE 24HR ORAL
Qty: 30 | Refills: 0 | OUTPATIENT
Start: 2017-05-18 | End: 2017-06-17

## 2017-05-18 RX ORDER — THIAMINE MONONITRATE (VIT B1) 100 MG
1 TABLET ORAL
Qty: 30 | Refills: 0 | OUTPATIENT
Start: 2017-05-18 | End: 2017-06-17

## 2017-05-18 RX ORDER — CEFPODOXIME PROXETIL 100 MG
1 TABLET ORAL
Qty: 4 | Refills: 0 | OUTPATIENT
Start: 2017-05-18 | End: 2017-05-20

## 2017-05-18 RX ORDER — MOXIFLOXACIN HYDROCHLORIDE TABLETS, 400 MG 400 MG/1
1 TABLET, FILM COATED ORAL
Qty: 2 | Refills: 0 | OUTPATIENT
Start: 2017-05-18 | End: 2017-05-20

## 2017-05-18 RX ORDER — DOCUSATE SODIUM 100 MG
1 CAPSULE ORAL
Qty: 30 | Refills: 0 | OUTPATIENT
Start: 2017-05-18 | End: 2017-06-17

## 2017-05-18 RX ORDER — FERROUS SULFATE 325(65) MG
1 TABLET ORAL
Qty: 30 | Refills: 0 | OUTPATIENT
Start: 2017-05-18 | End: 2017-06-17

## 2017-05-18 RX ADMIN — CEFTRIAXONE 100 GRAM(S): 500 INJECTION, POWDER, FOR SOLUTION INTRAMUSCULAR; INTRAVENOUS at 00:23

## 2017-05-18 RX ADMIN — Medication 100 MILLIGRAM(S): at 07:04

## 2017-05-18 RX ADMIN — Medication 10 MILLIGRAM(S): at 06:55

## 2017-05-18 RX ADMIN — PANTOPRAZOLE SODIUM 40 MILLIGRAM(S): 20 TABLET, DELAYED RELEASE ORAL at 06:55

## 2017-05-18 RX ADMIN — Medication 100 MILLIGRAM(S): at 13:34

## 2017-05-18 RX ADMIN — Medication 1 MILLIGRAM(S): at 13:34

## 2017-05-18 RX ADMIN — Medication 1 TABLET(S): at 13:34

## 2017-05-18 RX ADMIN — Medication 100 MILLIGRAM(S): at 13:33

## 2017-05-18 RX ADMIN — Medication 325 MILLIGRAM(S): at 13:33

## 2017-05-18 NOTE — PROGRESS NOTE ADULT - SUBJECTIVE AND OBJECTIVE BOX
Chief Complaint:  Patient is a 31y old  Male who presents with a chief complaint of Vomiting blood (13 May 2017 00:42)       Interval Events: No further bleeding. Tolerated PO diet. No signs/symptoms of withdrawal.    Allergies:  No Known Allergies      Hospital Medications:  octreotide  Infusion 25MICROgram(s)/Hr IV Continuous <Continuous>  cefTRIAXone   IVPB 1Gram(s) IV Intermittent every 24 hours  cefTRIAXone   IVPB  IV Intermittent   folic acid 1milliGRAM(s) Oral daily  multivitamin 1Tablet(s) Oral daily  thiamine 100milliGRAM(s) Oral daily  propranolol 10milliGRAM(s) Oral daily  pantoprazole    Tablet 40milliGRAM(s) Oral before breakfast  guaiFENesin    Syrup 100milliGRAM(s) Oral every 6 hours PRN  ferrous    sulfate 325milliGRAM(s) Oral daily  docusate sodium 100milliGRAM(s) Oral three times a day      PMHX/PSHX:  Liver disease due to alcohol  Bleeding esophageal varices, unspecified esophageal varices type      Family history:  No pertinent family history in first degree relatives      ROS:     General:  No wt loss, fevers, chills, night sweats, fatigue,   Eyes:  Good vision, no reported pain  ENT:  No sore throat, pain, runny nose, dysphagia  CV:  No pain, palpitations, hypo/hypertension  Resp:  No dyspnea, cough, tachypnea, wheezing  GI:  See HPI  :  No pain, bleeding, incontinence, nocturia  Muscle:  No pain, weakness  Neuro:  No weakness, tingling, memory problems  Psych:  No fatigue, insomnia, mood problems, depression  Endocrine:  No polyuria, polydipsia, cold/heat intolerance  Heme:  No petechiae, ecchymosis, easy bruisability  Skin:  No rash, edema      PHYSICAL EXAM:   Vital Signs:  Vital Signs Last 24 Hrs  T(C): 36.8, Max: 37.1 (05-16 @ 11:45)  T(F): 98.2, Max: 98.8 (05-16 @ 11:45)  HR: 52 (52 - 64)  BP: 98/57 (96/58 - 126/69)  BP(mean): --  RR: 16 (16 - 18)  SpO2: 100% (98% - 100%)  Daily     Daily     GENERAL:  No distress.  No confusion  HEENT:  NC/AT,  conjunctivae clear, sclera -anicteric  CHEST:  Full & symmetric excursion, no increased effort, breath sounds clear  HEART:  Regular rhythm, S1, S2, no murmur/rub/S3/S4,  no edema  ABDOMEN:  Soft, non-tender, non-distended, normoactive bowel sounds,  no masses ,no hepato-splenomegaly,   EXTEREMITIES:  no cyanosis,clubbing or edema, no asterixis  SKIN:  No rash/erythema/ecchymoses/petechiae/wounds/abscess/warm/dry  NEURO:  Alert, oriented,     LABS:                        9.5    6.19  )-----------( 77       ( 17 May 2017 05:26 )             33.2     05-17    136  |  98  |  14  ----------------------------<  83  4.2   |  23  |  0.72    Ca    9.1      17 May 2017 05:26  Phos  3.8     05-17  Mg     1.9     05-17    TPro  8.6<H>  /  Alb  4.1  /  TBili  1.5<H>  /  DBili  x   /  AST  108<H>  /  ALT  90<H>  /  AlkPhos  109  05-17    LIVER FUNCTIONS - ( 17 May 2017 05:26 )  Alb: 4.1 g/dL / Pro: 8.6 g/dL / ALK PHOS: 109 u/L / ALT: 90 u/L / AST: 108 u/L / GGT: x           PT/INR - ( 17 May 2017 05:26 )   PT: 14.6 SEC;   INR: 1.30                  Imaging:
Chief Complaint:  Patient is a 31y old  Male who presents with a chief complaint of Vomiting blood (13 May 2017 00:42)       Interval Events: No further bleeding. Tolerated PO diet. No signs/symptoms of withdrawal.    Allergies:  No Known Allergies      Hospital Medications:  octreotide  Infusion 25MICROgram(s)/Hr IV Continuous <Continuous>  cefTRIAXone   IVPB 1Gram(s) IV Intermittent every 24 hours  cefTRIAXone   IVPB  IV Intermittent   folic acid 1milliGRAM(s) Oral daily  multivitamin 1Tablet(s) Oral daily  thiamine 100milliGRAM(s) Oral daily  propranolol 10milliGRAM(s) Oral daily  pantoprazole    Tablet 40milliGRAM(s) Oral before breakfast  guaiFENesin    Syrup 100milliGRAM(s) Oral every 6 hours PRN  ferrous    sulfate 325milliGRAM(s) Oral daily  docusate sodium 100milliGRAM(s) Oral three times a day      PMHX/PSHX:  Liver disease due to alcohol  Bleeding esophageal varices, unspecified esophageal varices type      Family history:  No pertinent family history in first degree relatives      ROS:     General:  No wt loss, fevers, chills, night sweats, fatigue,   Eyes:  Good vision, no reported pain  ENT:  No sore throat, pain, runny nose, dysphagia  CV:  No pain, palpitations, hypo/hypertension  Resp:  No dyspnea, cough, tachypnea, wheezing  GI:  See HPI  :  No pain, bleeding, incontinence, nocturia  Muscle:  No pain, weakness  Neuro:  No weakness, tingling, memory problems  Psych:  No fatigue, insomnia, mood problems, depression  Endocrine:  No polyuria, polydipsia, cold/heat intolerance  Heme:  No petechiae, ecchymosis, easy bruisability  Skin:  No rash, edema      PHYSICAL EXAM:   Vital Signs:  Vital Signs Last 24 Hrs  T(C): 36.8, Max: 37.1 (05-16 @ 11:45)  T(F): 98.2, Max: 98.8 (05-16 @ 11:45)  HR: 52 (52 - 64)  BP: 98/57 (96/58 - 126/69)  BP(mean): --  RR: 16 (16 - 18)  SpO2: 100% (98% - 100%)  Daily     Daily     GENERAL:  No distress.  No confusion  HEENT:  NC/AT,  conjunctivae clear, sclera -anicteric  CHEST:  Full & symmetric excursion, no increased effort, breath sounds clear  HEART:  Regular rhythm, S1, S2, no murmur/rub/S3/S4,  no edema  ABDOMEN:  Soft, non-tender, non-distended, normoactive bowel sounds,  no masses ,no hepato-splenomegaly,   EXTEREMITIES:  no cyanosis,clubbing or edema, no asterixis  SKIN:  No rash/erythema/ecchymoses/petechiae/wounds/abscess/warm/dry  NEURO:  Alert, oriented,     LABS:                        9.5    6.19  )-----------( 77       ( 17 May 2017 05:26 )             33.2     05-17    136  |  98  |  14  ----------------------------<  83  4.2   |  23  |  0.72    Ca    9.1      17 May 2017 05:26  Phos  3.8     05-17  Mg     1.9     05-17    TPro  8.6<H>  /  Alb  4.1  /  TBili  1.5<H>  /  DBili  x   /  AST  108<H>  /  ALT  90<H>  /  AlkPhos  109  05-17    LIVER FUNCTIONS - ( 17 May 2017 05:26 )  Alb: 4.1 g/dL / Pro: 8.6 g/dL / ALK PHOS: 109 u/L / ALT: 90 u/L / AST: 108 u/L / GGT: x           PT/INR - ( 17 May 2017 05:26 )   PT: 14.6 SEC;   INR: 1.30                  Imaging:
Patient is a 31y old  Male who presents with a chief complaint of bloody vomit       SUBJECTIVE / OVERNIGHT EVENTS:  No acute events overnight. Patient feels well.     MEDICATIONS  (STANDING):  octreotide  Infusion 25MICROgram(s)/Hr IV Continuous <Continuous>  cefTRIAXone   IVPB 1Gram(s) IV Intermittent every 24 hours  cefTRIAXone   IVPB  IV Intermittent   folic acid 1milliGRAM(s) Oral daily  multivitamin 1Tablet(s) Oral daily  thiamine 100milliGRAM(s) Oral daily  propranolol 10milliGRAM(s) Oral daily  pantoprazole    Tablet 40milliGRAM(s) Oral before breakfast  ferrous    sulfate 325milliGRAM(s) Oral daily  docusate sodium 100milliGRAM(s) Oral three times a day    MEDICATIONS  (PRN):  guaiFENesin    Syrup 100milliGRAM(s) Oral every 6 hours PRN Cough      Vital Signs Last 24 Hrs  T(C): 37.1, Max: 37.1 (05-17 @ 15:01)  HR: 57 (52 - 61)  BP: 110/73 (96/58 - 112/72)  RR: 17 (16 - 18)  SpO2: 100% (100% - 100%)    I&O's Summary    I & Os for current day (as of 17 May 2017 16:00)  =============================================  IN: 0 ml / OUT: 500 ml / NET: -500 ml      PHYSICAL EXAM:  GENERAL: NAD, well developed   HEAD:  Atraumatic, Normocephalic  EYES: EOMI, PERRLA, conjunctiva and sclera clear  NECK: Supple, No JVD  CHEST/LUNG: Clear to auscultation bilaterally; No wheeze  HEART: Regular rate and rhythm; No murmurs, rubs, or gallops  ABDOMEN: Soft, Nontender, Nondistended; Bowel sounds present  EXTREMITIES:  2+ Peripheral Pulses, No clubbing, cyanosis, or edema  PSYCH: AAOx3  NEUROLOGY: non-focal  SKIN: No rashes or lesions    LABS:                        9.5    6.19  )-----------( 77       ( 17 May 2017 05:26 )             33.2     05-17    136  |  98  |  14  ----------------------------<  83  4.2   |  23  |  0.72    Ca    9.1      17 May 2017 05:26  Phos  3.8     05-17  Mg     1.9     05-17    TPro  8.6<H>  /  Alb  4.1  /  TBili  1.5<H>  /  DBili  x   /  AST  108<H>  /  ALT  90<H>  /  AlkPhos  109  05-17    PT/INR - ( 17 May 2017 05:26 )   PT: 14.6 SEC;   INR: 1.30                    RADIOLOGY & ADDITIONAL TESTS:    Consultant(s) Notes Reviewed:      Care Discussed with Consultants/Other Providers:  Hepatology.
Patient is a 31y old  Male who presents with a chief complaint of bloody vomit.     SUBJECTIVE / OVERNIGHT EVENTS:  Patient is having difficulty sleeping at night, not helped by benadryl or melatonin. Otherwise feels well. Has mild tremors.     MEDICATIONS  (STANDING):  octreotide  Infusion 25MICROgram(s)/Hr IV Continuous <Continuous>  cefTRIAXone   IVPB 1Gram(s) IV Intermittent every 24 hours  cefTRIAXone   IVPB  IV Intermittent   folic acid 1milliGRAM(s) Oral daily  multivitamin 1Tablet(s) Oral daily  thiamine 100milliGRAM(s) Oral daily  propranolol 10milliGRAM(s) Oral daily  pantoprazole    Tablet 40milliGRAM(s) Oral before breakfast  ferrous    sulfate 325milliGRAM(s) Oral daily  docusate sodium 100milliGRAM(s) Oral three times a day    MEDICATIONS  (PRN):  guaiFENesin    Syrup 100milliGRAM(s) Oral every 6 hours PRN Cough  melatonin 3milliGRAM(s) Oral at bedtime PRN Insomnia      Vital Signs Last 24 Hrs  T(C): 36.7, Max: 37.1 (05-17 @ 15:01)  HR: 55 (55 - 58)  BP: 106/69 (100/64 - 110/73)  RR: 18 (17 - 18)  SpO2: 100% (97% - 100%)    I&O's Summary    I & Os for current day (as of 18 May 2017 09:42)  =============================================  IN: 0 ml / OUT: 400 ml / NET: -400 ml      PHYSICAL EXAM:  GENERAL: NAD, well-developed  HEAD:  Atraumatic, Normocephalic  EYES: EOMI, PERRLA, conjunctiva and sclera clear  NECK: Supple, No JVD  CHEST/LUNG: Clear to auscultation bilaterally; No wheeze  HEART: Regular rate and rhythm; No murmurs, rubs, or gallops  ABDOMEN: Soft, Nontender, Nondistended; Bowel sounds present  EXTREMITIES:  2+ Peripheral Pulses, No clubbing, cyanosis, or edema  PSYCH: AAOx3  NEUROLOGY: non-focal  SKIN: No rashes or lesions    LABS:                        9.5    6.19  )-----------( 77       ( 17 May 2017 05:26 )             33.2     05-17    136  |  98  |  14  ----------------------------<  83  4.2   |  23  |  0.72    Ca    9.1      17 May 2017 05:26  Phos  3.8     05-17  Mg     1.9     05-17    TPro  8.6<H>  /  Alb  4.1  /  TBili  1.5<H>  /  DBili  x   /  AST  108<H>  /  ALT  90<H>  /  AlkPhos  109  05-17    PT/INR - ( 17 May 2017 05:26 )   PT: 14.6 SEC;   INR: 1.30          RADIOLOGY & ADDITIONAL TESTS:    Imaging Personally Reviewed:    Consultant(s) Notes Reviewed:  Hepatology     Care Discussed with Consultants/Other Providers:

## 2017-05-18 NOTE — DISCHARGE NOTE ADULT - CARE PROVIDER_API CALL
[facility],   [address] [place] [id number]  Phone: [phone]  Fax: (   )    -    Hepatology Clinic,   [address], [facility] [address]  [place], [place] [address] [phone]  Phone: (   )    -  Fax: (   )    -

## 2017-05-18 NOTE — DISCHARGE NOTE ADULT - MEDICATION SUMMARY - MEDICATIONS TO TAKE
I will START or STAY ON the medications listed below when I get home from the hospital:    propranolol 10 mg oral tablet  -- 1 tab(s) by mouth once a day  -- Indication: For Esophageal varices    cefpodoxime 200 mg oral tablet  -- 1 tab(s) by mouth 2 times a day  -- Finish all this medication unless otherwise directed by prescriber.  Take with food or milk.    -- Indication: For Esophageal varices    FeroSul 325 mg (65 mg elemental iron) oral tablet  -- 1 tab(s) by mouth once a day  -- Check with your doctor before becoming pregnant.  Do not chew, break, or crush.  May discolor urine or feces.    -- Indication: For Iron deficiency anemia    docusate sodium 100 mg oral capsule  -- 1 cap(s) by mouth once a day  -- Indication: For Constipation    pantoprazole 40 mg oral delayed release tablet  -- 1 tab(s) by mouth 2 times a day  -- Indication: For Esophageal Varices    Multiple Vitamins oral tablet  -- 1 tab(s) by mouth once a day  -- Indication: For Alcohol abuse    folic acid 1 mg oral tablet  -- 1 tab(s) by mouth once a day  -- Indication: For Alcohol abuse    thiamine 100 mg oral tablet  -- 1 tab(s) by mouth once a day  -- Indication: For Alcohol abuse I will START or STAY ON the medications listed below when I get home from the hospital:    propranolol 10 mg oral tablet  -- 1 tab(s) by mouth once a day  -- Indication: For Esophageal varices    FeroSul 325 mg (65 mg elemental iron) oral tablet  -- 1 tab(s) by mouth once a day  -- Check with your doctor before becoming pregnant.  Do not chew, break, or crush.  May discolor urine or feces.    -- Indication: For Iron deficiency anemia    docusate sodium 100 mg oral capsule  -- 1 cap(s) by mouth once a day  -- Indication: For Constipation    pantoprazole 40 mg oral delayed release tablet  -- 1 tab(s) by mouth 2 times a day  -- Indication: For Esophageal Varices    Cipro 500 mg oral tablet  -- 1 tab(s) by mouth once a day  -- Avoid prolonged or excessive exposure to direct and/or artificial sunlight while taking this medication.  Check with your doctor before becoming pregnant.  Do not take dairy products, antacids, or iron preparations within one hour of this medication.  Finish all this medication unless otherwise directed by prescriber.  Medication should be taken with plenty of water.    -- Indication: For Esophageal varices    Multiple Vitamins oral tablet  -- 1 tab(s) by mouth once a day  -- Indication: For Alcohol abuse    folic acid 1 mg oral tablet  -- 1 tab(s) by mouth once a day  -- Indication: For Alcohol abuse    thiamine 100 mg oral tablet  -- 1 tab(s) by mouth once a day  -- Indication: For Alcohol abuse

## 2017-05-18 NOTE — DISCHARGE NOTE ADULT - PATIENT PORTAL LINK FT
“You can access the [employer] Patient Portal, offered by [facility], by registering with the following website: [email or web address]”

## 2017-05-18 NOTE — DISCHARGE NOTE ADULT - CARE PLAN
Principal Discharge DX:	Liver cirrhosis  Goal:	follow closely with liver doctors  Instructions for follow-up, activity and diet:	You were diagnosed with cirrhosis of your liver, which means your liver is not functioning properly. This is extremely dangerous to your health. The likely reason for your alcohol is cirrhosis. Please follow up with your hepatologist on June 8 at 9 am for your appointment. It is very important that you go for your appointment for further management of your liver disease.  Secondary Diagnosis:	Esophageal varices  Instructions for follow-up, activity and diet:	You came to the hospital because you were vomiting blood. You had an endoscopy that showed varices in your esophagus. Seven bands were placed to stop the bleeding. Please follow up with gastroentrology for a repeat endoscopy in one month. Please abstain from drinking. Continue to take pantoprazole twice a day for 1 month and then once a day for two more months. Please take propranolol daily as prescribed. Please complete your course of antibiotics for 2 more days.  Secondary Diagnosis:	Alcohol dependency  Instructions for follow-up, activity and diet:	You were given resources by  for help with quitting alcohol use. It is extremely important for you to stop drinking as your liver disease will worsen with alcohol and you may vomit blood again. Please follow up with your PCP for further support and care.  Secondary Diagnosis:	Iron deficiency anemia  Instructions for follow-up, activity and diet:	You have low blood count causing anemia. Please take iron, folic acid, and thiamine supplements everyday to improve your blood counts. Please do not take iron with any milk products. Iron supplements can cause constipation, so please use stool softners as needed.

## 2017-05-18 NOTE — PROGRESS NOTE ADULT - ASSESSMENT
32 yo M with hx of EtOH abuse and liver cirrhosis presented with bloody vomit, found to have esophageal varices now s/p bands x7.     # Liver cirrhosis   - Likely secondary to alcohol abuse. A1AT elevated, IgA elevated, HIV negative. Hepatitis panel negative. Pt to follow with hepatology in clinic.     # alcohol abuse   - pt willing to quit, social work consulted. Pt give outpt alcohol rehab options    #esophageal varices   - Octreotide gtt ending today. Ceftraixone ending 5/21  - no active bleed, hb stable     #dispo: home today.
32 yo M with hx of EtOH abuse and liver cirrhosis presented with bloody vomit, found to have esophageal varices now s/p bands x7.     # Liver cirrhosis   - Likely secondary to alcohol abuse. Hepatology consult appreciated, hepatitis panel pending to rule out other causes     # alcohol abuse   - pt willing to quit, social work consulted. Pt give outpt alcohol rehab options    #esophageal varices   - cw octreotide gtt for 5d total, SBP for 7d total   - no active bleed, hb stable     #dispo: likely tomorrow after completion of octreotide gtt
Alcoholic cirrhosis with acute esophageal variceal bleed, s/p variceal band ligation. Bleeding resolved.
Alcoholic cirrhosis with acute esophageal variceal bleed, s/p variceal band ligation. Bleeding resolved.

## 2017-05-18 NOTE — DISCHARGE NOTE ADULT - PLAN OF CARE
follow closely with liver doctors You were diagnosed with cirrhosis of your liver, which means your liver is not functioning properly. This is extremely dangerous to your health. The likely reason for your alcohol is cirrhosis. Please follow up with your hepatologist on June 8 at 9 am for your appointment. It is very important that you go for your appointment for further management of your liver disease. You came to the hospital because you were vomiting blood. You had an endoscopy that showed varices in your esophagus. Seven bands were placed to stop the bleeding. Please follow up with gastroentrology for a repeat endoscopy in one month. Please abstain from drinking. Continue to take pantoprazole twice a day for 1 month and then once a day for two more months. Please take propranolol daily as prescribed. Please complete your course of antibiotics for 2 more days. You were given resources by  for help with quitting alcohol use. It is extremely important for you to stop drinking as your liver disease will worsen with alcohol and you may vomit blood again. Please follow up with your PCP for further support and care. You have low blood count causing anemia. Please take iron, folic acid, and thiamine supplements everyday to improve your blood counts. Please do not take iron with any milk products. Iron supplements can cause constipation, so please use stool softners as needed.

## 2017-05-18 NOTE — PROGRESS NOTE ADULT - PROBLEM SELECTOR PLAN 1
S/P EGD with variceal band ligation.  Continue PPI daily.  Completing octreotide, ceftriaxone  Followup labs ordered for alternative etiology of chronic liver disease (ceruloplasmin normal, A1AT normal, HBV non exposed)  ETOH cessation.  No signs/symptoms of withdrawal.  Repeat endoscopy in 1 month for surveillance of eradication. Clinic follow up [phone]

## 2017-05-18 NOTE — DISCHARGE NOTE ADULT - HOSPITAL COURSE
30 yo with liver cirrhosis, s/p two prior admissions for hematemesis in 1 year, alcohol abuse (7-8 beers/day since age 15) presented to the hospital with hematemesis of one day. Patient had been admitted few months ago with similar situation - he had tried quitting alcohol for few months, but found it difficult to do so given his work situation. In ED patient had another episode of hematemesis with ~ 300 cc blood loss. He was admitted to the MICU and intubated for airway protection. Emergency EGD was performed. Patient was found to have esophageal varices and gastropathy secondary to portal hypertension. He was banded x7, started on octreotide drip and IV protonix. Patient was extubated after a day and recovered. He was observed on medicine floors where he was continued on octreotide gtt for 5day total. Propranolol was added to the regiment with continuation of PPI twice a day. Patient was also given ceftriaxone for SBP prophylaxis x5 day with additional 2 day course of cefpodoxime prescribed. Patient did not have any recurrent upper GI bleed and has felt well. He was placed on symptom triggered CIWA initially for alcohol withdrawal, requiring few doses of Ativan but doing well off the protocol.     At this time patient is medically stable for discharge. He is to continue pantoprazole and propranolol at home and follow up with hepatology as outpatient on June 8th at 9am. Patient is also recommended to get an EGD a month after discharge for follow up of esophageal varices. 30 yo with liver cirrhosis, s/p two prior admissions for hematemesis in 1 year, alcohol abuse (7-8 beers/day since age 15) presented to the hospital with hematemesis of one day. Patient had been admitted few months ago with similar situation - he had tried quitting alcohol for few months, but found it difficult to do so given his work situation. In ED patient had another episode of hematemesis with ~ 300 cc blood loss. He was admitted to the MICU and intubated for airway protection. Emergency EGD was performed. Patient was found to have esophageal varices and gastropathy secondary to portal hypertension. He was banded x7, started on octreotide drip and IV protonix. Patient was extubated after a day and recovered. He was observed on medicine floors where he was continued on octreotide gtt for 5day total. Propranolol was added to the regiment with continuation of PPI twice a day. Patient was also given ceftriaxone for SBP prophylaxis x5 day with additional 2 day course of cefpodoxime prescribed. Patient did not have any recurrent upper GI bleed and has felt well. He was placed on symptom triggered CIWA initially for alcohol withdrawal, requiring few doses of Ativan but doing well off the protocol.     At this time patient is medically stable for discharge. He is to continue pantoprazole and propranolol at home and follow up with hepatology as outpatient on June 8th at 9am. They will followup remainder of pending liver labs. Patient is also recommended to get an EGD a month after discharge for follow up of esophageal varices.

## 2017-05-18 NOTE — DISCHARGE NOTE ADULT - PROVIDER TOKENS
FREE:[LAST:[[facility]],PHONE:[phone]],FAX:[(   )    -],ADDRESS:[[address] [place] [address]]],FREE:[LAST:[[facility]atology Clinic],PHONE:[(   )    -],FAX:[(   )    -],ADDRESS:[[address], [place] [address]  [phone]]]

## 2017-05-18 NOTE — PROGRESS NOTE ADULT - ATTENDING COMMENTS
Patient without complaints. No s/s of withdrawal. Plan to d/c today with f/u with hepatology. He will continue 2 more days of abx to complete SBP ppx course after EGD. Counselled extensively on EtOH cessation and provided resources by [facility].    DC planning time 35min

## 2017-05-30 ENCOUNTER — APPOINTMENT (OUTPATIENT)
Dept: INTERNAL MEDICINE | Facility: HOSPITAL | Age: 32
End: 2017-05-30

## 2017-06-08 ENCOUNTER — APPOINTMENT (OUTPATIENT)
Dept: GASTROENTEROLOGY | Facility: HOSPITAL | Age: 32
End: 2017-06-08

## 2019-11-20 NOTE — ED ADULT TRIAGE NOTE - NS ED NURSE BANDS TYPE
11/20/2019    CHIEF COMPLAINT:   Chief Complaint   Patient presents with   • Physical       HISTORY OF PRESENT ILLNESS:  Tyler Rosas presents for his routine physical exam. He has no current complaints or concerns.    Health Maintenance   Topic Date Due   • Influenza Vaccine (1) 09/01/2019   • Depression Screening  11/20/2020   • DTaP/Tdap/Td Vaccine (7 - Td) 10/29/2023   • Meningococcal Vaccine  Aged Out   • Pneumococcal Vaccine 0-64  Aged Out        Patient Active Problem List   Diagnosis   • Tinea versicolor   • Routine general medical examination at a health care facility   • Screening for lipoid disorders   • Screening for diabetes mellitus   • Blood pressure elevated without history of HTN       Past Medical History:   Diagnosis Date   • Elevated SGOT (AST) 11/15/2017    repeat    • Tinea versicolor        Past Surgical History:   Procedure Laterality Date   • Laminotomy  12/24/2003   • Laser surgery of eye  1/1/2002       Family History   Problem Relation Age of Onset   • Miscarriages / Stillbirths Sister    • Early death Maternal Grandfather         MI   • Vision Loss Paternal Grandmother    • Vision Loss Paternal Grandfather    • Psychiatric Other         Cousin- Learning disability   • Miscarriages / Stillbirths Other         wife - miscarriage   • Neurological Disorder Brother         Multiple Sclerosis       SOCIAL HISTORY:  Social History     Tobacco Use   • Smoking status: Never Smoker   • Smokeless tobacco: Never Used   Substance Use Topics   • Alcohol use: No     Alcohol/week: 0.0 standard drinks     Comment: Rarely       REVIEW OF SYSTEMS: see HPI; otherwise denies HEENT, NECK, RESP, CARDIAC, GI, , NEURO or PSYCH Sx    PHYSICAL EXAM:    VITAL SIGNS:   Visit Vitals  /80 (BP Location: LUE - Left upper extremity, Patient Position: Sitting, Cuff Size: Regular)   Pulse 72   Ht 6' (1.829 m)   Wt 116.9 kg   BMI 34.95 kg/m²     GENERAL APPEARANCE:Healthy, alert, in no distress, cooperative,  smiling.  SKIN:Skin color, texture, turgor are normal. There are no bruises, rashes or lesions.  HEAD:normocephalic. No masses, lesions, tenderness or abnormalities  EARS:External ears normal. Canals clear. Tympanic membranes are clear with all landmarks noted.  EYES:PERRL, EOMI, sclera white and nonicteric, no conjunctival erythema, exudate or swelling; normal lids  NOSE:normal  MOUTH:mucosa pink and moist.  THROAT:Lips, mucosa, and tongue normal. Teeth and gums normal. Oropharynx clear  NECK:symmetric, supple and without adenopathy  CHEST:symmetrical, no chest deformities noted and no chest wall tenderness  BACK:straight, symmetric and nontender to palpation  LUNGS:clear to auscultation  HEART:regular rate and rhythm and no murmurs, clicks, or gallops  ABDOMEN:Soft, non-tender, bowel sounds normal, no masses, hepatomegaly or splenomegaly noted  RECTAL:  Deferred per patient denies any concerns.  GENITILIA:  Deferred per patient denies any concerns.  EXTREMITIES:no clubbing, cyanosis, edema or deformity noted  NEUROLOGIC:CN II-XII intact. Sensory and motor grossly intact. Reflexes normal and symmetric.  Body mass index is 34.95 kg/m².    BMI ASSESSMENT/PLAN:  30minutes of physical activity a day   Over the last 2 weeks, how often have you been bothered by the following problems?          PHQ2 Score:  0  1. Little interest or pleasure in doing things?:  0  2. Feeling down, depressed, or hopeless?:  0         DEPRESSION ASSESSMENT/PLAN:  Depression screening is negative no further plan needed.  ASSESSMENT: healthy 43 year old male.    PLAN:  Annual physical exam  (primary encounter diagnosis)  Comment:  Patient is a healthy 43-year-old male with no new negative findings.    Essential hypertension  Comment:  Blood pressure is well controlled on lisinopril 10 mg p.o..  Will continue this.  CBC and CMP will be drawn today, will call the patient with these results.  Plan: lisinopril (ZESTRIL) 10 MG tablet, CBC WITH          DIFFERENTIAL, COMPREHENSIVE METABOLIC PANEL            Need for vaccination  Comment:  Patient is agreeable for flu vaccine today.  Plan: INFLUENZA QUADRIVALENT SPLIT PRES FREE 0.5 ML         VACC, IM (FLULAVAL,FLUARIX,FLUZONE)            Screening cholesterol level  Comment:  We will check a cholesterol level today.  Will call the patient with these results.  Plan: LIPID PANEL WITH REFLEX               Name band;

## 2021-09-26 NOTE — DISCHARGE NOTE ADULT - OTHER SIGNIFICANT FINDINGS
, 5/15/17 US Abdomen  Liver: Markedly heterogeneous parenchymal echotexture without focal   lesion. Mildly nodular surface contour, best seen along the posterior   aspect of the liver. The main, right and left portal veins are patent,   with hepatopedal flow.  Bile ducts: Normal caliber. Common hepatic duct measures 3 mm.   Gallbladder: No stones, wall thickening or pericholecystic fluid.    Negative sonographic Washburn's sign.      Pancreas: Visualized portions are within normal limits.  Right kidney: 12.3 cm. No hydronephrosis, stone or mass.  Normal    cortical echogenicity.    Ascites: No right upper quadrant ascites  IVC/Aorta: Not visualized

## 2024-04-08 NOTE — DISCHARGE NOTE ADULT - ADDITIONAL INSTRUCTIONS
VANDANA Recruitment: [facility]     Referral outreach attempt #3 on April 8, 2024      Outcome: left voicemail- Call back number [phone]    VANDANA Holland      Please follow up with Hepatology: June 8th at 9 AM [facility]   3rd Floor   Please follow up with your PCP within next 2-3 days after discharge for post hospitalization care. If you do not have a PCP, you can establish care at the [facility] by calling [phone]
